# Patient Record
Sex: MALE | Race: WHITE | NOT HISPANIC OR LATINO | Employment: UNEMPLOYED | URBAN - METROPOLITAN AREA
[De-identification: names, ages, dates, MRNs, and addresses within clinical notes are randomized per-mention and may not be internally consistent; named-entity substitution may affect disease eponyms.]

---

## 2020-11-03 DIAGNOSIS — Z11.59 ENCOUNTER FOR SCREENING FOR OTHER VIRAL DISEASES: Primary | ICD-10-CM

## 2020-11-16 DIAGNOSIS — Z11.59 ENCOUNTER FOR SCREENING FOR OTHER VIRAL DISEASES: Primary | ICD-10-CM

## 2020-11-23 DIAGNOSIS — Z11.59 ENCOUNTER FOR SCREENING FOR OTHER VIRAL DISEASES: ICD-10-CM

## 2020-11-23 PROCEDURE — U0003 INFECTIOUS AGENT DETECTION BY NUCLEIC ACID (DNA OR RNA); SEVERE ACUTE RESPIRATORY SYNDROME CORONAVIRUS 2 (SARS-COV-2) (CORONAVIRUS DISEASE [COVID-19]), AMPLIFIED PROBE TECHNIQUE, MAKING USE OF HIGH THROUGHPUT TECHNOLOGIES AS DESCRIBED BY CMS-2020-01-R: HCPCS | Performed by: ORTHOPAEDIC SURGERY

## 2020-11-24 LAB
SARS-COV-2 RNA SPEC QL NAA+PROBE: NOT DETECTED
SPECIMEN SOURCE: NORMAL

## 2020-11-26 RX ORDER — TAMSULOSIN HYDROCHLORIDE 0.4 MG/1
0.8 CAPSULE ORAL EVERY EVENING
COMMUNITY

## 2020-11-26 RX ORDER — ASPIRIN 81 MG
100 TABLET, DELAYED RELEASE (ENTERIC COATED) ORAL DAILY
COMMUNITY

## 2020-11-26 RX ORDER — CETIRIZINE HYDROCHLORIDE 10 MG/1
10 TABLET ORAL EVERY EVENING
COMMUNITY

## 2020-11-26 RX ORDER — PRIMIDONE 50 MG/1
50 TABLET ORAL 2 TIMES DAILY
COMMUNITY

## 2020-11-26 RX ORDER — PREGABALIN 150 MG/1
150 CAPSULE ORAL 2 TIMES DAILY
COMMUNITY

## 2020-11-26 RX ORDER — ASPIRIN 81 MG/1
81 TABLET ORAL EVERY EVENING
Status: ON HOLD | COMMUNITY
End: 2020-11-28

## 2020-11-26 RX ORDER — IBUPROFEN 200 MG
200-800 TABLET ORAL 3 TIMES DAILY PRN
Status: ON HOLD | COMMUNITY
End: 2020-11-28

## 2020-11-26 RX ORDER — CYANOCOBALAMIN 1000 UG/ML
1 INJECTION, SOLUTION INTRAMUSCULAR; SUBCUTANEOUS
COMMUNITY

## 2020-11-26 RX ORDER — DICYCLOMINE HCL 20 MG
20 TABLET ORAL EVERY MORNING
COMMUNITY

## 2020-11-26 RX ORDER — PRIMIDONE 250 MG/1
250 TABLET ORAL 2 TIMES DAILY
COMMUNITY

## 2020-11-27 ENCOUNTER — APPOINTMENT (OUTPATIENT)
Dept: PHYSICAL THERAPY | Facility: CLINIC | Age: 65
End: 2020-11-27
Attending: ORTHOPAEDIC SURGERY
Payer: COMMERCIAL

## 2020-11-27 ENCOUNTER — ANESTHESIA EVENT (OUTPATIENT)
Dept: SURGERY | Facility: CLINIC | Age: 65
End: 2020-11-27
Payer: COMMERCIAL

## 2020-11-27 ENCOUNTER — ANESTHESIA (OUTPATIENT)
Dept: SURGERY | Facility: CLINIC | Age: 65
End: 2020-11-27
Payer: COMMERCIAL

## 2020-11-27 ENCOUNTER — HOSPITAL ENCOUNTER (OUTPATIENT)
Facility: CLINIC | Age: 65
Discharge: HOME | End: 2020-11-28
Attending: ORTHOPAEDIC SURGERY | Admitting: ORTHOPAEDIC SURGERY
Payer: COMMERCIAL

## 2020-11-27 ENCOUNTER — APPOINTMENT (OUTPATIENT)
Dept: GENERAL RADIOLOGY | Facility: CLINIC | Age: 65
End: 2020-11-27
Attending: ORTHOPAEDIC SURGERY
Payer: COMMERCIAL

## 2020-11-27 DIAGNOSIS — Z96.652 S/P TOTAL KNEE ARTHROPLASTY, LEFT: Primary | ICD-10-CM

## 2020-11-27 LAB
CREAT SERPL-MCNC: 1.04 MG/DL (ref 0.66–1.25)
GFR SERPL CREATININE-BSD FRML MDRD: 76 ML/MIN/{1.73_M2}
GLUCOSE BLDC GLUCOMTR-MCNC: 141 MG/DL (ref 70–99)
GLUCOSE SERPL-MCNC: 117 MG/DL (ref 70–99)
POTASSIUM SERPL-SCNC: 4.3 MMOL/L (ref 3.4–5.3)

## 2020-11-27 PROCEDURE — 250N000011 HC RX IP 250 OP 636: Performed by: PHYSICIAN ASSISTANT

## 2020-11-27 PROCEDURE — 250N000009 HC RX 250: Performed by: ORTHOPAEDIC SURGERY

## 2020-11-27 PROCEDURE — 370N000002 HC ANESTHESIA TECHNICAL FEE, EACH ADDTL 15 MIN: Performed by: ORTHOPAEDIC SURGERY

## 2020-11-27 PROCEDURE — 97116 GAIT TRAINING THERAPY: CPT | Mod: GP | Performed by: PHYSICAL THERAPIST

## 2020-11-27 PROCEDURE — 278N000051 HC OR IMPLANT GENERAL: Performed by: ORTHOPAEDIC SURGERY

## 2020-11-27 PROCEDURE — 36415 COLL VENOUS BLD VENIPUNCTURE: CPT | Performed by: ANESTHESIOLOGY

## 2020-11-27 PROCEDURE — 250N000011 HC RX IP 250 OP 636: Performed by: NURSE ANESTHETIST, CERTIFIED REGISTERED

## 2020-11-27 PROCEDURE — 360N000026 HC SURGERY LEVEL 4 1ST 30 MIN: Performed by: ORTHOPAEDIC SURGERY

## 2020-11-27 PROCEDURE — 250N000009 HC RX 250: Performed by: NURSE ANESTHETIST, CERTIFIED REGISTERED

## 2020-11-27 PROCEDURE — 97161 PT EVAL LOW COMPLEX 20 MIN: CPT | Mod: GP | Performed by: PHYSICAL THERAPIST

## 2020-11-27 PROCEDURE — 250N000009 HC RX 250: Performed by: ANESTHESIOLOGY

## 2020-11-27 PROCEDURE — 999N001017 HC STATISTIC GLUCOSE BY METER IP

## 2020-11-27 PROCEDURE — 370N000001 HC ANESTHESIA TECHNICAL FEE, 1ST 30 MIN: Performed by: ORTHOPAEDIC SURGERY

## 2020-11-27 PROCEDURE — 761N000001 HC RECOVERY PHASE 1 LEVEL 1 FIRST HR: Performed by: ORTHOPAEDIC SURGERY

## 2020-11-27 PROCEDURE — 82565 ASSAY OF CREATININE: CPT | Performed by: ANESTHESIOLOGY

## 2020-11-27 PROCEDURE — 258N000003 HC RX IP 258 OP 636: Performed by: PHYSICIAN ASSISTANT

## 2020-11-27 PROCEDURE — 97530 THERAPEUTIC ACTIVITIES: CPT | Mod: GP | Performed by: PHYSICAL THERAPIST

## 2020-11-27 PROCEDURE — 250N000011 HC RX IP 250 OP 636: Performed by: ANESTHESIOLOGY

## 2020-11-27 PROCEDURE — 258N000001 HC RX 258: Performed by: ORTHOPAEDIC SURGERY

## 2020-11-27 PROCEDURE — 999N000063 XR KNEE PORT LT 1/2 VW: Mod: LT

## 2020-11-27 PROCEDURE — 258N000003 HC RX IP 258 OP 636: Performed by: ORTHOPAEDIC SURGERY

## 2020-11-27 PROCEDURE — 258N000003 HC RX IP 258 OP 636: Performed by: ANESTHESIOLOGY

## 2020-11-27 PROCEDURE — 250N000013 HC RX MED GY IP 250 OP 250 PS 637: Performed by: ORTHOPAEDIC SURGERY

## 2020-11-27 PROCEDURE — 84132 ASSAY OF SERUM POTASSIUM: CPT | Performed by: ANESTHESIOLOGY

## 2020-11-27 PROCEDURE — 82947 ASSAY GLUCOSE BLOOD QUANT: CPT | Performed by: ANESTHESIOLOGY

## 2020-11-27 PROCEDURE — 250N000013 HC RX MED GY IP 250 OP 250 PS 637: Performed by: ANESTHESIOLOGY

## 2020-11-27 PROCEDURE — 272N000001 HC OR GENERAL SUPPLY STERILE: Performed by: ORTHOPAEDIC SURGERY

## 2020-11-27 PROCEDURE — 250N000013 HC RX MED GY IP 250 OP 250 PS 637: Performed by: PHYSICIAN ASSISTANT

## 2020-11-27 PROCEDURE — 999N000140 HC STATISTIC PRE-PROCEDURE ASSESSMENT III: Performed by: ORTHOPAEDIC SURGERY

## 2020-11-27 PROCEDURE — C1776 JOINT DEVICE (IMPLANTABLE): HCPCS | Performed by: ORTHOPAEDIC SURGERY

## 2020-11-27 PROCEDURE — 250N000011 HC RX IP 250 OP 636: Performed by: ORTHOPAEDIC SURGERY

## 2020-11-27 PROCEDURE — 360N000027 HC SURGERY LEVEL 4 EA 15 ADDTL MIN: Performed by: ORTHOPAEDIC SURGERY

## 2020-11-27 PROCEDURE — 258N000003 HC RX IP 258 OP 636: Performed by: NURSE ANESTHETIST, CERTIFIED REGISTERED

## 2020-11-27 PROCEDURE — 97110 THERAPEUTIC EXERCISES: CPT | Mod: GP | Performed by: PHYSICAL THERAPIST

## 2020-11-27 DEVICE — GEN II RESURFACING PATELLA 38MM
Type: IMPLANTABLE DEVICE | Site: KNEE | Status: FUNCTIONAL
Brand: GENESIS II

## 2020-11-27 DEVICE — LEGION PS OXINIUM FEMORAL SZ 8 LEFT
Type: IMPLANTABLE DEVICE | Site: KNEE | Status: FUNCTIONAL
Brand: LEGION

## 2020-11-27 DEVICE — GENESIS II NON-POROUS TIBIAL                                    BASEPLATE SIZE 7 LEFT
Type: IMPLANTABLE DEVICE | Site: KNEE | Status: FUNCTIONAL
Brand: GENESIS II

## 2020-11-27 DEVICE — BONE CEMENT RADIOPAQUE SIMPLEX HV FULL DOSE 6194-1-001: Type: IMPLANTABLE DEVICE | Site: KNEE | Status: FUNCTIONAL

## 2020-11-27 DEVICE — LEGION PS HIGH FLEX XLPE SZ 7-8 10MM
Type: IMPLANTABLE DEVICE | Site: KNEE | Status: FUNCTIONAL
Brand: LEGION

## 2020-11-27 RX ORDER — BISACODYL 10 MG
10 SUPPOSITORY, RECTAL RECTAL DAILY PRN
Status: DISCONTINUED | OUTPATIENT
Start: 2020-11-27 | End: 2020-11-28 | Stop reason: HOSPADM

## 2020-11-27 RX ORDER — CEFAZOLIN SODIUM 2 G/100ML
2 INJECTION, SOLUTION INTRAVENOUS EVERY 8 HOURS
Status: COMPLETED | OUTPATIENT
Start: 2020-11-27 | End: 2020-11-28

## 2020-11-27 RX ORDER — HYDROMORPHONE HYDROCHLORIDE 1 MG/ML
0.2 INJECTION, SOLUTION INTRAMUSCULAR; INTRAVENOUS; SUBCUTANEOUS
Status: DISCONTINUED | OUTPATIENT
Start: 2020-11-27 | End: 2020-11-28 | Stop reason: HOSPADM

## 2020-11-27 RX ORDER — LIDOCAINE 40 MG/G
CREAM TOPICAL
Status: DISCONTINUED | OUTPATIENT
Start: 2020-11-27 | End: 2020-11-28 | Stop reason: HOSPADM

## 2020-11-27 RX ORDER — ACETAMINOPHEN 325 MG/1
975 TABLET ORAL ONCE
Status: COMPLETED | OUTPATIENT
Start: 2020-11-27 | End: 2020-11-27

## 2020-11-27 RX ORDER — FENTANYL CITRATE 50 UG/ML
25 INJECTION, SOLUTION INTRAMUSCULAR; INTRAVENOUS EVERY 5 MIN PRN
Status: DISCONTINUED | OUTPATIENT
Start: 2020-11-27 | End: 2020-11-27 | Stop reason: HOSPADM

## 2020-11-27 RX ORDER — PROCHLORPERAZINE MALEATE 5 MG
10 TABLET ORAL EVERY 6 HOURS PRN
Status: DISCONTINUED | OUTPATIENT
Start: 2020-11-27 | End: 2020-11-28 | Stop reason: HOSPADM

## 2020-11-27 RX ORDER — PRIMIDONE 50 MG/1
50 TABLET ORAL 2 TIMES DAILY
Status: DISCONTINUED | OUTPATIENT
Start: 2020-11-27 | End: 2020-11-27 | Stop reason: DRUGHIGH

## 2020-11-27 RX ORDER — NALOXONE HYDROCHLORIDE 0.4 MG/ML
0.4 INJECTION, SOLUTION INTRAMUSCULAR; INTRAVENOUS; SUBCUTANEOUS
Status: DISCONTINUED | OUTPATIENT
Start: 2020-11-27 | End: 2020-11-28 | Stop reason: HOSPADM

## 2020-11-27 RX ORDER — LISINOPRIL 10 MG/1
10 TABLET ORAL DAILY
Status: DISCONTINUED | OUTPATIENT
Start: 2020-11-27 | End: 2020-11-28 | Stop reason: HOSPADM

## 2020-11-27 RX ORDER — TAMSULOSIN HYDROCHLORIDE 0.4 MG/1
0.8 CAPSULE ORAL EVERY EVENING
Status: DISCONTINUED | OUTPATIENT
Start: 2020-11-27 | End: 2020-11-28 | Stop reason: HOSPADM

## 2020-11-27 RX ORDER — SODIUM CHLORIDE, SODIUM LACTATE, POTASSIUM CHLORIDE, CALCIUM CHLORIDE 600; 310; 30; 20 MG/100ML; MG/100ML; MG/100ML; MG/100ML
INJECTION, SOLUTION INTRAVENOUS CONTINUOUS
Status: DISCONTINUED | OUTPATIENT
Start: 2020-11-27 | End: 2020-11-27 | Stop reason: HOSPADM

## 2020-11-27 RX ORDER — CEFAZOLIN SODIUM IN 0.9 % NACL 3 G/100 ML
3 INTRAVENOUS SOLUTION, PIGGYBACK (ML) INTRAVENOUS
Status: COMPLETED | OUTPATIENT
Start: 2020-11-27 | End: 2020-11-27

## 2020-11-27 RX ORDER — ONDANSETRON 2 MG/ML
4 INJECTION INTRAMUSCULAR; INTRAVENOUS EVERY 6 HOURS PRN
Status: DISCONTINUED | OUTPATIENT
Start: 2020-11-27 | End: 2020-11-28 | Stop reason: HOSPADM

## 2020-11-27 RX ORDER — HYDROXYZINE HYDROCHLORIDE 25 MG/1
25 TABLET, FILM COATED ORAL EVERY 6 HOURS PRN
Qty: 30 TABLET | Refills: 0 | Status: SHIPPED | OUTPATIENT
Start: 2020-11-27

## 2020-11-27 RX ORDER — BUPIVACAINE HYDROCHLORIDE 7.5 MG/ML
INJECTION, SOLUTION INTRASPINAL PRN
Status: DISCONTINUED | OUTPATIENT
Start: 2020-11-27 | End: 2020-11-27

## 2020-11-27 RX ORDER — HYDROXYZINE HYDROCHLORIDE 25 MG/1
25 TABLET, FILM COATED ORAL EVERY 6 HOURS PRN
Status: DISCONTINUED | OUTPATIENT
Start: 2020-11-27 | End: 2020-11-28 | Stop reason: HOSPADM

## 2020-11-27 RX ORDER — FENTANYL CITRATE 50 UG/ML
50 INJECTION, SOLUTION INTRAMUSCULAR; INTRAVENOUS
Status: COMPLETED | OUTPATIENT
Start: 2020-11-27 | End: 2020-11-27

## 2020-11-27 RX ORDER — SODIUM CHLORIDE, SODIUM LACTATE, POTASSIUM CHLORIDE, CALCIUM CHLORIDE 600; 310; 30; 20 MG/100ML; MG/100ML; MG/100ML; MG/100ML
INJECTION, SOLUTION INTRAVENOUS CONTINUOUS
Status: DISCONTINUED | OUTPATIENT
Start: 2020-11-27 | End: 2020-11-28 | Stop reason: HOSPADM

## 2020-11-27 RX ORDER — LIDOCAINE HYDROCHLORIDE 20 MG/ML
INJECTION, SOLUTION INFILTRATION; PERINEURAL PRN
Status: DISCONTINUED | OUTPATIENT
Start: 2020-11-27 | End: 2020-11-27

## 2020-11-27 RX ORDER — ONDANSETRON 4 MG/1
4 TABLET, ORALLY DISINTEGRATING ORAL EVERY 6 HOURS PRN
Status: DISCONTINUED | OUTPATIENT
Start: 2020-11-27 | End: 2020-11-28 | Stop reason: HOSPADM

## 2020-11-27 RX ORDER — ACETAMINOPHEN 325 MG/1
650 TABLET ORAL EVERY 4 HOURS PRN
Status: DISCONTINUED | OUTPATIENT
Start: 2020-11-30 | End: 2020-11-28 | Stop reason: HOSPADM

## 2020-11-27 RX ORDER — CETIRIZINE HYDROCHLORIDE 10 MG/1
10 TABLET ORAL EVERY EVENING
Status: DISCONTINUED | OUTPATIENT
Start: 2020-11-27 | End: 2020-11-28 | Stop reason: HOSPADM

## 2020-11-27 RX ORDER — OXYCODONE HYDROCHLORIDE 5 MG/1
5 TABLET ORAL
Status: DISCONTINUED | OUTPATIENT
Start: 2020-11-27 | End: 2020-11-28

## 2020-11-27 RX ORDER — PREGABALIN 150 MG/1
150 CAPSULE ORAL 2 TIMES DAILY
Status: DISCONTINUED | OUTPATIENT
Start: 2020-11-27 | End: 2020-11-28 | Stop reason: HOSPADM

## 2020-11-27 RX ORDER — TRANEXAMIC ACID 650 MG/1
1950 TABLET ORAL ONCE
Status: COMPLETED | OUTPATIENT
Start: 2020-11-27 | End: 2020-11-27

## 2020-11-27 RX ORDER — OXYCODONE HYDROCHLORIDE 5 MG/1
10 TABLET ORAL EVERY 4 HOURS PRN
Status: DISCONTINUED | OUTPATIENT
Start: 2020-11-27 | End: 2020-11-28

## 2020-11-27 RX ORDER — PROPOFOL 10 MG/ML
INJECTION, EMULSION INTRAVENOUS PRN
Status: DISCONTINUED | OUTPATIENT
Start: 2020-11-27 | End: 2020-11-27

## 2020-11-27 RX ORDER — CEFAZOLIN SODIUM 1 G/3ML
1 INJECTION, POWDER, FOR SOLUTION INTRAMUSCULAR; INTRAVENOUS SEE ADMIN INSTRUCTIONS
Status: DISCONTINUED | OUTPATIENT
Start: 2020-11-27 | End: 2020-11-27 | Stop reason: HOSPADM

## 2020-11-27 RX ORDER — POLYETHYLENE GLYCOL 3350 17 G/17G
17 POWDER, FOR SOLUTION ORAL DAILY
Status: DISCONTINUED | OUTPATIENT
Start: 2020-11-28 | End: 2020-11-28 | Stop reason: HOSPADM

## 2020-11-27 RX ORDER — HYDROMORPHONE HYDROCHLORIDE 1 MG/ML
0.4 INJECTION, SOLUTION INTRAMUSCULAR; INTRAVENOUS; SUBCUTANEOUS
Status: DISCONTINUED | OUTPATIENT
Start: 2020-11-27 | End: 2020-11-28 | Stop reason: HOSPADM

## 2020-11-27 RX ORDER — DOCUSATE SODIUM 100 MG/1
100 CAPSULE, LIQUID FILLED ORAL 2 TIMES DAILY
Status: DISCONTINUED | OUTPATIENT
Start: 2020-11-27 | End: 2020-11-28 | Stop reason: HOSPADM

## 2020-11-27 RX ORDER — EPHEDRINE SULFATE 50 MG/ML
INJECTION, SOLUTION INTRAMUSCULAR; INTRAVENOUS; SUBCUTANEOUS PRN
Status: DISCONTINUED | OUTPATIENT
Start: 2020-11-27 | End: 2020-11-27

## 2020-11-27 RX ORDER — ACETAMINOPHEN 325 MG/1
975 TABLET ORAL EVERY 8 HOURS
Status: DISCONTINUED | OUTPATIENT
Start: 2020-11-27 | End: 2020-11-28 | Stop reason: HOSPADM

## 2020-11-27 RX ORDER — AMOXICILLIN 250 MG
1-2 CAPSULE ORAL 2 TIMES DAILY
Qty: 30 TABLET | Refills: 0 | Status: SHIPPED | OUTPATIENT
Start: 2020-11-27

## 2020-11-27 RX ORDER — NALOXONE HYDROCHLORIDE 0.4 MG/ML
0.2 INJECTION, SOLUTION INTRAMUSCULAR; INTRAVENOUS; SUBCUTANEOUS
Status: DISCONTINUED | OUTPATIENT
Start: 2020-11-27 | End: 2020-11-28 | Stop reason: HOSPADM

## 2020-11-27 RX ORDER — DICYCLOMINE HCL 20 MG
20 TABLET ORAL EVERY MORNING
Status: DISCONTINUED | OUTPATIENT
Start: 2020-11-27 | End: 2020-11-28 | Stop reason: HOSPADM

## 2020-11-27 RX ORDER — HYDROMORPHONE HYDROCHLORIDE 1 MG/ML
.3-.5 INJECTION, SOLUTION INTRAMUSCULAR; INTRAVENOUS; SUBCUTANEOUS EVERY 5 MIN PRN
Status: DISCONTINUED | OUTPATIENT
Start: 2020-11-27 | End: 2020-11-27 | Stop reason: HOSPADM

## 2020-11-27 RX ORDER — FENTANYL CITRATE 50 UG/ML
25-50 INJECTION, SOLUTION INTRAMUSCULAR; INTRAVENOUS
Status: DISCONTINUED | OUTPATIENT
Start: 2020-11-27 | End: 2020-11-27 | Stop reason: HOSPADM

## 2020-11-27 RX ORDER — ONDANSETRON 2 MG/ML
4 INJECTION INTRAMUSCULAR; INTRAVENOUS EVERY 30 MIN PRN
Status: DISCONTINUED | OUTPATIENT
Start: 2020-11-27 | End: 2020-11-27 | Stop reason: HOSPADM

## 2020-11-27 RX ORDER — ONDANSETRON 2 MG/ML
INJECTION INTRAMUSCULAR; INTRAVENOUS PRN
Status: DISCONTINUED | OUTPATIENT
Start: 2020-11-27 | End: 2020-11-27

## 2020-11-27 RX ORDER — FENTANYL CITRATE 50 UG/ML
INJECTION, SOLUTION INTRAMUSCULAR; INTRAVENOUS PRN
Status: DISCONTINUED | OUTPATIENT
Start: 2020-11-27 | End: 2020-11-27

## 2020-11-27 RX ORDER — ONDANSETRON 4 MG/1
4 TABLET, ORALLY DISINTEGRATING ORAL EVERY 30 MIN PRN
Status: DISCONTINUED | OUTPATIENT
Start: 2020-11-27 | End: 2020-11-27 | Stop reason: HOSPADM

## 2020-11-27 RX ORDER — PROPOFOL 10 MG/ML
INJECTION, EMULSION INTRAVENOUS CONTINUOUS PRN
Status: DISCONTINUED | OUTPATIENT
Start: 2020-11-27 | End: 2020-11-27

## 2020-11-27 RX ORDER — OXYCODONE HYDROCHLORIDE 5 MG/1
5-10 TABLET ORAL
Qty: 50 TABLET | Refills: 0 | Status: SHIPPED | OUTPATIENT
Start: 2020-11-27

## 2020-11-27 RX ORDER — MAGNESIUM HYDROXIDE 1200 MG/15ML
LIQUID ORAL PRN
Status: DISCONTINUED | OUTPATIENT
Start: 2020-11-27 | End: 2020-11-27 | Stop reason: HOSPADM

## 2020-11-27 RX ORDER — ACETAMINOPHEN 325 MG/1
650 TABLET ORAL EVERY 4 HOURS PRN
Qty: 100 TABLET | Refills: 0 | Status: SHIPPED | OUTPATIENT
Start: 2020-11-27

## 2020-11-27 RX ORDER — AMOXICILLIN 250 MG
1 CAPSULE ORAL 2 TIMES DAILY
Status: DISCONTINUED | OUTPATIENT
Start: 2020-11-27 | End: 2020-11-28 | Stop reason: HOSPADM

## 2020-11-27 RX ADMIN — Medication 5 MG: at 08:03

## 2020-11-27 RX ADMIN — BUPIVACAINE HYDROCHLORIDE IN DEXTROSE 2 ML: 7.5 INJECTION, SOLUTION SUBARACHNOID at 07:44

## 2020-11-27 RX ADMIN — PRIMIDONE 300 MG: 50 TABLET ORAL at 22:46

## 2020-11-27 RX ADMIN — BUPIVACAINE HYDROCHLORIDE 20 ML GIVEN: 5 INJECTION, SOLUTION EPIDURAL; INTRACAUDAL; PERINEURAL at 07:12

## 2020-11-27 RX ADMIN — HYDROMORPHONE HYDROCHLORIDE 0.4 MG: 1 INJECTION, SOLUTION INTRAMUSCULAR; INTRAVENOUS; SUBCUTANEOUS at 23:59

## 2020-11-27 RX ADMIN — ASPIRIN 325 MG: 325 TABLET, COATED ORAL at 12:59

## 2020-11-27 RX ADMIN — TAMSULOSIN HYDROCHLORIDE 0.8 MG: 0.4 CAPSULE ORAL at 22:47

## 2020-11-27 RX ADMIN — FENTANYL CITRATE 50 MCG: 50 INJECTION, SOLUTION INTRAMUSCULAR; INTRAVENOUS at 07:39

## 2020-11-27 RX ADMIN — OXYCODONE HYDROCHLORIDE 5 MG: 5 TABLET ORAL at 13:55

## 2020-11-27 RX ADMIN — ONDANSETRON 4 MG: 2 INJECTION INTRAMUSCULAR; INTRAVENOUS at 09:00

## 2020-11-27 RX ADMIN — Medication 5 MG: at 07:55

## 2020-11-27 RX ADMIN — PROPOFOL 40 MG: 10 INJECTION, EMULSION INTRAVENOUS at 08:01

## 2020-11-27 RX ADMIN — TRANEXAMIC ACID 1950 MG: 650 TABLET ORAL at 06:07

## 2020-11-27 RX ADMIN — SODIUM CHLORIDE, POTASSIUM CHLORIDE, SODIUM LACTATE AND CALCIUM CHLORIDE: 600; 310; 30; 20 INJECTION, SOLUTION INTRAVENOUS at 09:33

## 2020-11-27 RX ADMIN — FENTANYL CITRATE 25 MCG: 50 INJECTION, SOLUTION INTRAMUSCULAR; INTRAVENOUS at 07:07

## 2020-11-27 RX ADMIN — PHENYLEPHRINE HYDROCHLORIDE 50 MCG: 10 INJECTION INTRAVENOUS at 07:55

## 2020-11-27 RX ADMIN — DEXMEDETOMIDINE HYDROCHLORIDE: 100 INJECTION, SOLUTION INTRAVENOUS at 07:42

## 2020-11-27 RX ADMIN — Medication 1 G: at 09:42

## 2020-11-27 RX ADMIN — ACETAMINOPHEN 975 MG: 325 TABLET, FILM COATED ORAL at 23:57

## 2020-11-27 RX ADMIN — ASPIRIN 325 MG: 325 TABLET, COATED ORAL at 21:28

## 2020-11-27 RX ADMIN — PHENYLEPHRINE HYDROCHLORIDE 50 MCG: 10 INJECTION INTRAVENOUS at 08:01

## 2020-11-27 RX ADMIN — PREGABALIN 150 MG: 150 CAPSULE ORAL at 21:28

## 2020-11-27 RX ADMIN — DOCUSATE SODIUM 50 MG AND SENNOSIDES 8.6 MG 1 TABLET: 8.6; 5 TABLET, FILM COATED ORAL at 22:47

## 2020-11-27 RX ADMIN — HYDROXYZINE HYDROCHLORIDE 25 MG: 25 TABLET, FILM COATED ORAL at 21:28

## 2020-11-27 RX ADMIN — LIDOCAINE HYDROCHLORIDE 80 MG: 20 INJECTION, SOLUTION INFILTRATION; PERINEURAL at 07:50

## 2020-11-27 RX ADMIN — OXYCODONE HYDROCHLORIDE 10 MG: 5 TABLET ORAL at 18:08

## 2020-11-27 RX ADMIN — MIDAZOLAM 2 MG: 1 INJECTION INTRAMUSCULAR; INTRAVENOUS at 07:35

## 2020-11-27 RX ADMIN — CETIRIZINE HYDROCHLORIDE 10 MG: 10 TABLET, FILM COATED ORAL at 21:28

## 2020-11-27 RX ADMIN — CEFAZOLIN SODIUM 2 G: 2 INJECTION, SOLUTION INTRAVENOUS at 18:08

## 2020-11-27 RX ADMIN — Medication 3 G: at 07:43

## 2020-11-27 RX ADMIN — PHENYLEPHRINE HYDROCHLORIDE 100 MCG: 10 INJECTION INTRAVENOUS at 09:28

## 2020-11-27 RX ADMIN — METFORMIN HYDROCHLORIDE 1000 MG: 500 TABLET, FILM COATED ORAL at 18:08

## 2020-11-27 RX ADMIN — HYDROXYZINE HYDROCHLORIDE 25 MG: 25 TABLET, FILM COATED ORAL at 12:58

## 2020-11-27 RX ADMIN — DICYCLOMINE HYDROCHLORIDE 20 MG: 20 TABLET ORAL at 13:55

## 2020-11-27 RX ADMIN — SODIUM CHLORIDE, POTASSIUM CHLORIDE, SODIUM LACTATE AND CALCIUM CHLORIDE: 600; 310; 30; 20 INJECTION, SOLUTION INTRAVENOUS at 07:00

## 2020-11-27 RX ADMIN — HYDROMORPHONE HYDROCHLORIDE 0.2 MG: 1 INJECTION, SOLUTION INTRAMUSCULAR; INTRAVENOUS; SUBCUTANEOUS at 21:28

## 2020-11-27 RX ADMIN — PHENYLEPHRINE HYDROCHLORIDE 0.5 MCG/KG/MIN: 10 INJECTION INTRAVENOUS at 08:10

## 2020-11-27 RX ADMIN — ACETAMINOPHEN 975 MG: 325 TABLET, FILM COATED ORAL at 16:31

## 2020-11-27 RX ADMIN — OXYCODONE HYDROCHLORIDE 10 MG: 5 TABLET ORAL at 22:21

## 2020-11-27 RX ADMIN — PHENYLEPHRINE HYDROCHLORIDE 100 MCG: 10 INJECTION INTRAVENOUS at 08:03

## 2020-11-27 RX ADMIN — ACETAMINOPHEN 975 MG: 325 TABLET, FILM COATED ORAL at 07:00

## 2020-11-27 RX ADMIN — SODIUM CHLORIDE, POTASSIUM CHLORIDE, SODIUM LACTATE AND CALCIUM CHLORIDE: 600; 310; 30; 20 INJECTION, SOLUTION INTRAVENOUS at 12:29

## 2020-11-27 RX ADMIN — PROPOFOL 75 MCG/KG/MIN: 10 INJECTION, EMULSION INTRAVENOUS at 07:47

## 2020-11-27 RX ADMIN — HYDROMORPHONE HYDROCHLORIDE 0.4 MG: 1 INJECTION, SOLUTION INTRAMUSCULAR; INTRAVENOUS; SUBCUTANEOUS at 18:54

## 2020-11-27 RX ADMIN — MIDAZOLAM HYDROCHLORIDE 1 MG: 1 INJECTION, SOLUTION INTRAMUSCULAR; INTRAVENOUS at 07:07

## 2020-11-27 RX ADMIN — DEXMEDETOMIDINE HYDROCHLORIDE 12 MCG: 100 INJECTION, SOLUTION INTRAVENOUS at 07:41

## 2020-11-27 RX ADMIN — LISINOPRIL 10 MG: 10 TABLET ORAL at 12:59

## 2020-11-27 RX ADMIN — HYDROMORPHONE HYDROCHLORIDE 0.2 MG: 1 INJECTION, SOLUTION INTRAMUSCULAR; INTRAVENOUS; SUBCUTANEOUS at 12:28

## 2020-11-27 RX ADMIN — DOCUSATE SODIUM 100 MG: 100 CAPSULE, LIQUID FILLED ORAL at 21:28

## 2020-11-27 RX ADMIN — Medication 5 MG: at 08:01

## 2020-11-27 ASSESSMENT — ENCOUNTER SYMPTOMS
SEIZURES: 0
DYSRHYTHMIAS: 0
ORTHOPNEA: 0

## 2020-11-27 ASSESSMENT — MIFFLIN-ST. JEOR: SCORE: 2177.92

## 2020-11-27 NOTE — ANESTHESIA PREPROCEDURE EVALUATION
Anesthesia Pre-Procedure Evaluation    Patient: Johny Pisano   MRN: [id number] : 1955          Preoperative Diagnosis: Left knee DJD [M17.12]    Procedure(s):  LEFT TOTAL KNEE ARTHROPLASTY    Past Medical History:   Diagnosis Date     Acute insomnia      Benign essential tremor      BPH (benign prostatic hyperplasia)      Depression      Diabetes mellitus, type 2 (H)      Diabetic foot ulcers (H)      Diabetic ulcer of toe (H)      HTN (hypertension)      Hx of basal cell carcinoma      Hyperlipidemia      IBS (irritable bowel syndrome)      Neuropathy      Obesity      Obesity      Right knee pain      Snores      Tremor      Tubular adenoma of colon      Vitamin B12 deficiency      Past Surgical History:   Procedure Laterality Date     ARTHROSCOPY KNEE WITH MEDIAL MENISCECTOMY  10/24/2013    Procedure: ARTHROSCOPY KNEE WITH MEDIAL MENISCECTOMY;  RIGHT KNEE ARTHROSCOPY, PARTIAL MEDIAL MENISCECTOMY, Patella Chrondoplasty  ;  Surgeon: Mannie Lopes MD;  Location: [facility]     ATHROSCOPIC KNEE SURGERY[       FISTULOTOMY RECTUM       JOINT REPLACEMENT       NASAL SEPTIM SURGERY[       REPAIR TENDON ACHILLES       EKG - SB, first degree AVB, LAD    Anesthesia Evaluation     .             ROS/MED HX    ENT/Pulmonary:     (+)CARLI risk factors snores loudly, hypertension, obese, , . .   (-) recent URI   Neurologic: Comment: Insomnia  Essential tremor    (+)neuropathy    (-) seizures, CVA and TIA   Cardiovascular:     (+) hypertension----. : . . . :. .      (-) CHF, orthopnea/PND and arrhythmias   METS/Exercise Tolerance:     Hematologic:  - neg hematologic  ROS       Musculoskeletal: Comment: Diabetic toe ulcer  (+) arthritis,  -       GI/Hepatic:  - neg GI/hepatic ROS   (+) Other GI/Hepatic IBS      Renal/Genitourinary:     (+) BPH,       Endo:     (+) type II DM Last HgA1c: 5.7 Diabetic complications: neuropathy, Obesity, .      Psychiatric:     (+) psychiatric history depression      Infectious Disease:     "     Malignancy:   (+) Malignancy History of Skin          Other:                          Physical Exam  Normal systems: cardiovascular, pulmonary and dental    Airway   Mallampati: III  TM distance: >3 FB  Neck ROM: full    Dental   (+) partials    Cardiovascular   Rhythm and rate: regular and normal      Pulmonary    breath sounds clear to auscultation            No results found for: WBC, HGB, HCT, PLT, CRP, SED, NA, POTASSIUM, CHLORIDE, CO2, BUN, CR, GLC, DUGLAS, PHOS, MAG, ALBUMIN, PROTTOTAL, ALT, AST, GGT, ALKPHOS, BILITOTAL, BILIDIRECT, LIPASE, AMYLASE, STEVO, PTT, INR, FIBR, TSH, T4, T3, HCG, HCGS, CKTOTAL, CKMB, TROPN    Preop Vitals  BP Readings from Last 3 Encounters:   10/24/13 132/78    Pulse Readings from Last 3 Encounters:   No data found for Pulse      Resp Readings from Last 3 Encounters:   10/24/13 18    SpO2 Readings from Last 3 Encounters:   10/24/13 97%      Temp Readings from Last 1 Encounters:   10/24/13 37.1  C (98.7  F)    Ht Readings from Last 1 Encounters:   10/24/13 1.854 m (6' 1\")      Wt Readings from Last 1 Encounters:   10/24/13 (!) 178.5 kg (393 lb 8 oz)    Estimated body mass index is 51.92 kg/m  as calculated from the following:    Height as of 10/24/13: 1.854 m (6' 1\").    Weight as of 10/24/13: 178.5 kg (393 lb 8 oz).       Anesthesia Plan      History & Physical Review  History and physical reviewed and following examination; no interval change.    ASA Status:  3 .    NPO Status:  > 8 hours    Plan for Spinal   PONV prophylaxis:  Ondansetron (or other 5HT-3)  The surgeon has given a verbal order transferring care of this patient to me for the performance of a regional analgesia block for post-op pain control. It is requested of me because I am uniquely trained and qualified to perform this block and the surgeon is neither trained nor qualified to perform this procedure.        Postoperative Care  Postoperative pain management:  Multi-modal analgesia.      Consents  Anesthetic " plan, risks, benefits and alternatives discussed with:  Patient..                 Teodoro Hahn MD

## 2020-11-27 NOTE — OP NOTE
Procedure Date: 11/27/2020      PREOPERATIVE DIAGNOSIS:  Left knee advanced osteoarthritis.      POSTOPERATIVE DIAGNOSIS:  Left knee advanced osteoarthritis.      PROCEDURE:  Left total knee arthroplasty.      SURGEON:  Mannie Lopes MD      ASSISTANT:  Aranza Will, TOYIN, PA-C      ANESTHESIA:  Spinal, IV sedation, periarticular block, adductor canal nerve block.      ESTIMATED BLOOD LOSS:  20 mL      IMPLANTS:  [employer] system:   1.  Size 8 left Oxinium PS femoral component.   2.  Size 7 left standard tibial baseplate.   3.  Size 10, 7/8 high-flex PS polyethylene.   4.  Size 38 concentric polyethylene patella.      INDICATIONS:  Johny was brought to the operating room on 11/27/2020 for left total knee arthroplasty.  Seen day of surgery in the preoperative holding area, and the left knee was marked as the correct operative site.  Received a dose of IV antibiotic within 30 minutes prior to surgical incision.  Post-surgical antibiotic and DVT prophylaxis are indicated and will be prescribed.  Skilled assistant was used for positioning, draping, retraction, closure, dressing application.      DESCRIPTION OF PROCEDURE:  The patient was brought to the operating room, placed under a spinal anesthesia.  Positioned supine.  The left lower extremity was prepped and draped in the standard sterile fashion.  Preoperative timeout was taken and thigh tourniquet inflated to 300 mmHg.  Anterior longitudinal surgical incision was made.  Medial parapatellar arthrotomy performed.  Suprapatellar synovium and infrapatellar fat pad excised.  Patella was cut to a thickness of 14 mm and sized to a 38.  The femur was prepared with an intramedullary guide asif and a 4-degree valgus cutting guide.  I took an extra 4 mm of distal femur due to the patient's preoperative flexion contracture.  The cut distal femur was sized to an 8.  It was prepared in the appropriate rotational alignment.  Box cut for the PS component  was made.  The tibia was then prepared with an extramedullary cutting guide, taking 7 mm of bone and cartilage off the lateral side.  The cut proximal tibia was sized to a 7.  It was punched and centered on the medial third of the tibial tubercle.  Posterior osteophytes were removed.  Menisci removed.  Posterior capsule injected with a cocktail of Toradol, Ropivacaine and saline.  With trial components in place and a size 10 PS polyethylene trial, the knee had excellent stability, range of motion and balanced gaps.  Trial components were removed.  Jet lavage irrigation performed.  Components listed above were cemented into place using 2 batches of high viscosity Simplex cement.  Once the cement had hardened and all extruded cement removed, we implanted the size 10 PS polyethylene for the 7 baseplate.  Tourniquet deflated.  Hemostasis achieved.  Betadine wash performed.  Jet lavage irrigation performed.  The arthrotomy was closed with interrupted Ethibond sutures over a medium Hemovac drain.  Superficial soft tissues were irrigated, closed in layers, and a sterile dressing was applied.  The patient was brought to the recovery area in stable condition.  Needle and lap counts were correct at the end of the case.  There were no known complications.         HORTENSIA TOLLIVER MD             D: 2020   T: 2020   MT:       Name:     NOMAN ALLRED   MRN:      [id number]        Account:        [id number]   :      1955           Procedure Date: 2020      Document: [id number]

## 2020-11-27 NOTE — PROGRESS NOTES
PTA medications updated by Medication Scribe prior to surgery via phone call with patient      -LAST DOSES ENTERED BY NURSE-    Comments:    Medication history sources: Patient, Surescripts and H&P  Medication history source reliability: Good  Adherence assessment: N/A Not Observed    Significant changes made to the medication list:  None      Additional medication history information:   None        Prior to Admission medications    Medication Sig Last Dose Taking? Auth Provider   aspirin 81 MG EC tablet Take 81 mg by mouth every evening  at PM Yes Reported, Patient   cetirizine (ZYRTEC) 10 MG tablet Take 10 mg by mouth every evening  at PM Yes Reported, Patient   cyanocobalamin (CYANOCOBALAMIN) 1000 MCG/ML injection Inject 1 mL into the muscle every 30 days 11/7/2020 Yes Reported, Patient   dicyclomine (BENTYL) 20 MG tablet Take 20 mg by mouth every morning  at AM Yes Reported, Patient   docusate sodium (COLACE) 100 MG tablet Take 100 mg by mouth daily  at AM Yes Reported, Patient   ibuprofen (ADVIL/MOTRIN) 200 MG tablet Take 200-800 mg by mouth 3 times daily as needed for mild pain  at PRN Yes Reported, Patient   Lisinopril (ZESTRIL PO) Take 10 mg by mouth daily   at AM Yes Reported, Patient   metFORMIN (GLUCOPHAGE) 1000 MG tablet Take 1,000 mg by mouth 2 times daily (with meals)   Yes Reported, Patient   Multiple Vitamins-Minerals (MULTIVITAMIN & MINERAL PO) Take 1 tablet by mouth daily   at AM Yes Reported, Patient   pregabalin (LYRICA) 150 MG capsule Take 150 mg by mouth 2 times daily  Yes Reported, Patient   primidone (MYSOLINE) 250 MG tablet Take 250 mg by mouth 2 times daily (in addition to 50mg tablet = 300mg dose)  Yes Reported, Patient   primidone (MYSOLINE) 50 MG tablet Take 50 mg by mouth 2 times daily (in addition to 250mg tablet = 300mg dose)  Yes Reported, Patient   PSYLLIUM PO Take 1 Tablespoonful by mouth 2 times daily  Yes Reported, Patient   tamsulosin (FLOMAX) 0.4 MG capsule Take 0.8 mg by mouth  every evening (2 x 0.4mg)  at PM Yes Reported, Patient   Vitamin D (Cholecalciferol) 25 MCG (1000 UT) CAPS Take 1,000 Units by mouth every evening   at PM Yes Reported, Patient

## 2020-11-27 NOTE — PROGRESS NOTES
Outpatient goals:  ~ Patient vital signs are at baseline: met  ~ Patient able to ambulate as they were prior to admission or with assist devices provided by therapies during their stay: not met- needs to perform stairs  ~ Patient MUST void prior to discharge: met  ~ Patient able to tolerate oral intake to maintain hydration status: met  ~ Patient has adequate pain control using Oral analgesics: not met, taking prn oxy, iv dilaudid, atarax  ~ Hypercapnia, hypoventilation or hypoxia resolved for at least 2 hours without supplemental oxygen: met  ~ Deficits in sensation, mobility or coordination have resolved if spinal or regional anesthesia was used: met  ~ Patient has returned to baseline mental status: met

## 2020-11-27 NOTE — ANESTHESIA PROCEDURE NOTES
Procedure note : Adductor canal      Staff -   Anesthesiologist:  Teodoro Hahn MD  Performed By: Anesthesiologist and anesthesiologist  Pre-Procedure  Performed by Teodoro Hahn MD  Location: pre-op      Pre-Anesthestic Checklist: patient identified, IV checked, site marked, risks and benefits discussed, informed consent, monitors and equipment checked, pre-op evaluation, at physician/surgeon's request and post-op pain management    Timeout  Correct Patient: Yes   Correct Procedure: Yes   Correct Site: Yes   Correct Laterality: Yes   Correct Position: Yes   Site Marked: Yes   .   Procedure Documentation    .    Procedure: Adductor canal, left.   Patient Position:supine Local skin infiltrated with 2 mL of 1% lidocaine.    Ultrasound used to identify targeted nerve, plexus, or vascular marker and placed a needle adjacent to it., Ultrasound was used to visualize the spread of the anesthetic in close proximity to the above stated nerve. A permanent image is entered into the patient's record.  Patient Prep/Sterile Barriers; chlorhexidine gluconate and isopropyl alcohol.  .  Needle: insulated, short bevel   Needle Gauge: 21.  Needle Length (millimeters) 100  Insertion Method: Single Shot.        Assessment/Narrative  Paresthesias: No.  .  The placement was negative for: blood aspirated, painful injection and site bleeding.  Bolus given via needle. No blood aspirated via catheter.   Secured via.   Complications: none. Comments:  Ultrasound Interpretation, Peripheral Nerve Block    1. Under ultrasound guidance, the needle was inserted and placed in close proximity to the target nerve(s).  2. Ultrasound was also used to visualize the spread of the anesthetic in close proximity to the nerve(s) being blocked.  Local anesthetic was administered in incremental doses, with intermittent negative aspiration.    3. The nerve(s) appeared anatomically normal.  4. There were no apparent abnormal pathological findings.  5. A  permanent ultrasound image was saved in the patient's record.    Patient tolerated procedure well   No complications.    The surgeon has given a verbal order transferring care of this patient to me for the performance of a regional analgesia block for post-op pain control. It is requested of me because I am uniquely trained and qualified to perform this block and the surgeon is neither trained nor qualified to perform this procedure.    Pt tolerated well.    No complications.      The surgeon has given a verbal order transferring care of this patient to me for the performance of a regional analgesia block for post-op pain control. It is requested of me because I am uniquely trained and qualified to perform this block and the surgeon is neither trained nor qualified to perform this procedure.

## 2020-11-27 NOTE — ANESTHESIA CARE TRANSFER NOTE
Patient: Johny Pisano    Procedure(s):  LEFT TOTAL KNEE ARTHROPLASTY    Diagnosis: Left knee DJD [M17.12]  Diagnosis Additional Information: No value filed.    Anesthesia Type:   Spinal     Note:  Airway :Face Mask  Patient transferred to:PACU  Comments: Pt exhibits spont resps, all monitors and alarms on in pacu, report given to RN, vss.Handoff Report: Identifed the Patient, Identified the Reponsible Provider, Reviewed the pertinent medical history, Discussed the surgical course, Reviewed Intra-OP anesthesia mangement and issues during anesthesia, Set expectations for post-procedure period and Allowed opportunity for questions and acknowledgement of understanding      Vitals: (Last set prior to Anesthesia Care Transfer)    CRNA VITALS  11/27/2020 0925 - 11/27/2020 1001      11/27/2020             Pulse:  75    SpO2:  98 %    Resp Rate (set):  10                Electronically Signed By: AMY Livingston CRNA  November 27, 2020  10:01 AM

## 2020-11-27 NOTE — BRIEF OP NOTE
[facility]    Brief Operative Note    Pre-operative diagnosis: Left knee DJD [M17.12]  Post-operative diagnosis Same as pre-operative diagnosis    Procedure: Procedure(s):  LEFT TOTAL KNEE ARTHROPLASTY  Surgeon: Surgeon(s) and Role:     * Mannie Lopes MD - Primary     * Aranza Will PA-C - Assisting  Anesthesia: Spinal   Estimated blood loss: 25  Drains: Hemovac  Specimens: * No specimens in log *  Findings:   None.  Complications: None.  Implants:   Implant Name Type Inv. Item Serial No.  Lot No. LRB No. Used Action   BONE CEMENT RADIOPAQUE SIMPLEX HV FULL DOSE 6194-1-001 Cement, Bone BONE CEMENT RADIOPAQUE SIMPLEX HV FULL DOSE 6194-1-001  [employer] [id number] Left 2 Implanted   IMP COMP PATELLA SNN GEN II RESURFACING 38MM [id number] Total Joint Component/Insert IMP COMP PATELLA SNN GEN II RESURFACING 38MM [id number]  [employer]  [id number] Left 1 Implanted   IMP BASEPLATE TIBIAL LALITO II SZ 7 LT TI [id number] Total Joint Component/Insert IMP BASEPLATE TIBIAL LALITO II SZ 7 LT TI [id number]  [employer]  [id number] Left 1 Implanted   IMP COMP FEMORAL S&N LEGION PS OXIN SZ8 LT [id number] Total Joint Component/Insert IMP COMP FEMORAL S&N LEGION PS OXIN SZ8 LT [id number]  [employer]  [id number] Left 1 Implanted   IMP COMP KNEE S&N LEGION PS HI-FLEX XLPE SZ7-8 10MM [id number] Total Joint Component/Insert IMP COMP KNEE S&N LEGION PS HI-FLEX XLPE SZ7-8 10MM [id number]  [employer]  [id number] Left 1 Implanted

## 2020-11-27 NOTE — PROGRESS NOTES
11/27/20 1453   Quick Adds   Type of Visit Initial PT Evaluation   Living Environment   People in home spouse  (Yesica)   Current Living Arrangements house   Home Accessibility stairs to enter home;stairs within home   Number of Stairs, Main Entrance 2  (Grab bar)   Number of Stairs, Within Home, Primary 8   Stair Railings, Within Home, Primary railing on left side (ascending)   Transportation Anticipated car, drives self;family or friend will provide   Living Environment Comments Pt's spouse is able to assist at time of discharge.    Self-Care   Usual Activity Tolerance good   Current Activity Tolerance moderate   Regular Exercise Yes   Activity/Exercise Type strength training;biking  (Pre-op book exercises)   Equipment Currently Used at Home none   Disability/Function   Fall history within last six months yes   Number of times patient has fallen within last six months 1   General Information   Onset of Illness/Injury or Date of Surgery 11/27/20   Referring Physician Mannie Lopes MD   Patient/Family Therapy Goals Statement (PT) Return home.    Pertinent History of Current Problem (include personal factors and/or comorbidities that impact the POC) 64 y/o male POD # 0 L TKA.    Existing Precautions/Restrictions fall   Weight-Bearing Status - LLE weight-bearing as tolerated   General Observations Pt in supine upon arrival of therapist.    Cognition   Orientation Status (Cognition) oriented x 3   Affect/Mental Status (Cognition) WFL   Follows Commands (Cognition) WFL   Pain Assessment   Patient Currently in Pain   (L knee pain at rest: 6/10)   Integumentary/Edema   Integumentary/Edema Comments L knee incision covered with dressing.    Range of Motion (ROM)   ROM Comment L knee ROM: 12-92 degrees, otherwise B LEs WFL.    Strength   Strength Comments Not formally assessed, pt demonstrates at least 3/5 grossly in B LEs. Pt demonstrated sufficient L LE strength to complete SLR independently.   Bed Mobility    Comment (Bed Mobility) Supine <> sit with SBA.    Transfers   Transfer Safety Comments Sit <> stand with FWW and CGA.    Gait/Stairs (Locomotion)   Comment (Gait/Stairs) Pt amb 5' with FWW and CGA.    Balance   Balance Comments Noted good seated and standing balance at FWW.    Sensory Examination   Sensory Perception Comments Pt reports numbness in B feet d/t neuropathy at baseline.    Clinical Impression   Criteria for Skilled Therapeutic Intervention yes, treatment indicated   PT Diagnosis (PT) Difficulty with gait.    Influenced by the following impairments Pain, Impaired L knee ROM, Decreased strength, Decreased activity tolerance   Functional limitations due to impairments Limited functional mobility requiring AD and assist.    Clinical Presentation Stable/Uncomplicated   Clinical Presentation Rationale Based on PMH, current presentation, and social support.    Clinical Decision Making (Complexity) low complexity   Therapy Frequency (PT) 2x/day   Predicted Duration of Therapy Intervention (days/wks) 2 days   Planned Therapy Interventions (PT) bed mobility training;gait training;ROM (range of motion);stair training;strengthening;transfer training   Anticipated Equipment Needs at Discharge (PT) walker, rolling   Risk & Benefits of therapy have been explained patient   PT Discharge Planning    PT Rationale for DC Rec Anticipate pt will require SBA for transfers and ambulation with use of FWW, CGA to navigate stairs    Total Evaluation Time   Total Evaluation Time (Minutes) 5

## 2020-11-27 NOTE — ANESTHESIA PROCEDURE NOTES
Procedure note : intrathecal      Staff -   Anesthesiologist:  Teodoro Hahn MD  Performed By: anesthesiologist  Pre-Procedure  Performed by Teodoro Hahn MD  Location: OR      Pre-Anesthestic Checklist: patient identified, IV checked, site marked, risks and benefits discussed, informed consent, monitors and equipment checked and pre-op evaluation    Timeout  Correct Patient: Yes   Correct Procedure: Yes   Correct Site: Yes   Correct Laterality: N/A   Correct Position: Yes   Site Marked: N/A   .   Procedure Documentation  ASA 3  .    Procedure: intrathecal, .   Patient Position:sitting  (midline approach)     Patient Prep/Sterile Barriers; mask, sterile gloves, povidone-iodine 7.5% surgical scrub, patient draped.  .  Needle:  Spinal Needle (gauge): 25  Spinal/LP Needle Length (inches): 3.5 # of attempts: 1 and # of redirects: : none. Introducer used Introducer: 20 G .        Assessment/Narrative  Paresthesias: No.  .  .  clear CSF fluid removed . Comments:  Patient tolerated procedure well   No complications  Spinal injected with 15 mg hyperbaric marcaine

## 2020-11-27 NOTE — ANESTHESIA POSTPROCEDURE EVALUATION
Patient: Johny Pisano    Procedure(s):  LEFT TOTAL KNEE ARTHROPLASTY    Diagnosis:Left knee DJD [M17.12]  Diagnosis Additional Information: No value filed.    Anesthesia Type:  Spinal    Note:  Anesthesia Post Evaluation    Patient location during evaluation: PACU  Patient participation: Able to participate in evaluation but full recovery from regional anesthesia has not yet ocurrred but is anticipated to occur within 48 hours  Level of consciousness: awake  Pain management: adequate  Airway patency: patent  Cardiovascular status: acceptable  Respiratory status: acceptable  Hydration status: acceptable  PONV: none     Anesthetic complications: None          Last vitals:  Vitals:    11/27/20 1245 11/27/20 1345 11/27/20 1541   BP: 122/64 120/65 (!) 157/72   Pulse: 61 62 66   Resp:   16   Temp:   36.6  C (97.8  F)   SpO2: 97% 97% 94%         Electronically Signed By: Teodoro Hahn MD  November 27, 2020  3:45 PM

## 2020-11-28 ENCOUNTER — APPOINTMENT (OUTPATIENT)
Dept: PHYSICAL THERAPY | Facility: CLINIC | Age: 65
End: 2020-11-28
Attending: ORTHOPAEDIC SURGERY
Payer: COMMERCIAL

## 2020-11-28 VITALS
OXYGEN SATURATION: 98 % | DIASTOLIC BLOOD PRESSURE: 47 MMHG | WEIGHT: 293 LBS | HEIGHT: 73 IN | BODY MASS INDEX: 38.83 KG/M2 | TEMPERATURE: 97.8 F | SYSTOLIC BLOOD PRESSURE: 95 MMHG | RESPIRATION RATE: 16 BRPM | HEART RATE: 61 BPM

## 2020-11-28 LAB
GLUCOSE BLDC GLUCOMTR-MCNC: 114 MG/DL (ref 70–99)
HGB BLD-MCNC: 10.6 G/DL (ref 13.3–17.7)

## 2020-11-28 PROCEDURE — 250N000013 HC RX MED GY IP 250 OP 250 PS 637: Performed by: ORTHOPAEDIC SURGERY

## 2020-11-28 PROCEDURE — 97116 GAIT TRAINING THERAPY: CPT | Mod: GP

## 2020-11-28 PROCEDURE — 250N000009 HC RX 250: Performed by: ORTHOPAEDIC SURGERY

## 2020-11-28 PROCEDURE — 85018 HEMOGLOBIN: CPT | Performed by: ORTHOPAEDIC SURGERY

## 2020-11-28 PROCEDURE — 999N001017 HC STATISTIC GLUCOSE BY METER IP

## 2020-11-28 PROCEDURE — 36415 COLL VENOUS BLD VENIPUNCTURE: CPT | Performed by: ORTHOPAEDIC SURGERY

## 2020-11-28 PROCEDURE — 97110 THERAPEUTIC EXERCISES: CPT | Mod: GP

## 2020-11-28 PROCEDURE — 250N000011 HC RX IP 250 OP 636: Performed by: ORTHOPAEDIC SURGERY

## 2020-11-28 PROCEDURE — 97530 THERAPEUTIC ACTIVITIES: CPT | Mod: GP

## 2020-11-28 RX ORDER — OXYCODONE HYDROCHLORIDE 5 MG/1
5 TABLET ORAL
Status: DISCONTINUED | OUTPATIENT
Start: 2020-11-28 | End: 2020-11-28 | Stop reason: HOSPADM

## 2020-11-28 RX ORDER — OXYCODONE HYDROCHLORIDE 5 MG/1
10 TABLET ORAL
Status: DISCONTINUED | OUTPATIENT
Start: 2020-11-28 | End: 2020-11-28 | Stop reason: HOSPADM

## 2020-11-28 RX ADMIN — ACETAMINOPHEN 975 MG: 325 TABLET, FILM COATED ORAL at 08:00

## 2020-11-28 RX ADMIN — DICYCLOMINE HYDROCHLORIDE 20 MG: 20 TABLET ORAL at 08:00

## 2020-11-28 RX ADMIN — HYDROMORPHONE HYDROCHLORIDE 0.4 MG: 1 INJECTION, SOLUTION INTRAMUSCULAR; INTRAVENOUS; SUBCUTANEOUS at 05:14

## 2020-11-28 RX ADMIN — POLYETHYLENE GLYCOL 3350 17 G: 17 POWDER, FOR SOLUTION ORAL at 08:00

## 2020-11-28 RX ADMIN — OXYCODONE HYDROCHLORIDE 10 MG: 5 TABLET ORAL at 07:23

## 2020-11-28 RX ADMIN — DOCUSATE SODIUM 100 MG: 100 CAPSULE, LIQUID FILLED ORAL at 08:01

## 2020-11-28 RX ADMIN — PRIMIDONE 300 MG: 50 TABLET ORAL at 09:41

## 2020-11-28 RX ADMIN — METFORMIN HYDROCHLORIDE 1000 MG: 500 TABLET, FILM COATED ORAL at 08:00

## 2020-11-28 RX ADMIN — DOCUSATE SODIUM 50 MG AND SENNOSIDES 8.6 MG 1 TABLET: 8.6; 5 TABLET, FILM COATED ORAL at 08:00

## 2020-11-28 RX ADMIN — OXYCODONE HYDROCHLORIDE 10 MG: 5 TABLET ORAL at 03:03

## 2020-11-28 RX ADMIN — ONDANSETRON 4 MG: 2 INJECTION INTRAMUSCULAR; INTRAVENOUS at 00:41

## 2020-11-28 RX ADMIN — OXYCODONE HYDROCHLORIDE 10 MG: 5 TABLET ORAL at 10:32

## 2020-11-28 RX ADMIN — HYDROXYZINE HYDROCHLORIDE 25 MG: 25 TABLET, FILM COATED ORAL at 05:14

## 2020-11-28 RX ADMIN — PREGABALIN 150 MG: 150 CAPSULE ORAL at 08:00

## 2020-11-28 RX ADMIN — CEFAZOLIN SODIUM 2 G: 2 INJECTION, SOLUTION INTRAVENOUS at 03:03

## 2020-11-28 RX ADMIN — ASPIRIN 325 MG: 325 TABLET, COATED ORAL at 08:00

## 2020-11-28 NOTE — PLAN OF CARE
Patient states he feels he has cold sweat and tremors. Patient states he has baseline tremors  And he thinks tylenol makes his stomach sick. Writer educate patient stomach pain can cause from taking pain medication without  adequate food intake .  BS check and administer extra carb snacks provided him the shakiness stops

## 2020-11-28 NOTE — PLAN OF CARE
Neuro- stable, CMS intact   Most Recent Vitals- Temp: 97.7  F (36.5  C) Temp src: Oral BP: 103/53 Pulse: 67   Resp: 14 SpO2: 94 % O2 Device: Nasal cannula Oxygen Delivery: 2 LPM  Tele/Cardiac- NA  Resp- 2L NC  Activity- SBA to BR  Pain- PRN  Drips- NA  Drains/Tubes- NA  Skin- incesion  GI/- voiding  Aggression Color- green  COVID status- negative  Plan- TBD  Misc- Patient pod 1 A&O VSS pain control has been issue writer educate the client to decrease narcotic usage and increase the ice and non narctoic pain medication. Patint continously asking all his medication so he make suure he is not feeling the pin, Although he has major surgery patient was able to sleep in between cares and unable to justify the intense of his pain while he is distracted. Writer spend most of time educate appropriate use of pain medication patient express his understanding .    Kaylie Herrera RN

## 2020-11-28 NOTE — PROGRESS NOTES
Discharge goals:  ~ Patient vital signs are at baseline: met  ~ Patient able to ambulate as they were prior to admission or with assist devices provided by therapies during their stay: met   ~ Patient MUST void prior to discharge: met  ~ Patient able to tolerate oral intake to maintain hydration status: met   ~ Patient has adequate pain control using Oral analgesics: met   ~ Hypercapnia, hypoventilation or hypoxia resolved for at least 2 hours without supplemental oxygen: met  ~ Deficits in sensation, mobility or coordination have resolved if spinal or regional anesthesia was used: met  ~ Patient has returned to baseline mental status: met    Reviewed AVS with pt, discharge medications given, discharging home with spouse.

## 2020-11-28 NOTE — PROGRESS NOTES
POD#1 L TKA    Doing well  Pain well managed  AVSS  CMS intact  Dressing CDI  Calf soft and nontender  XRay looks good  Home today after PT  Aspirin  Follow-up with me in 2 weeks    Mannie Lopes MD

## 2020-11-30 NOTE — DISCHARGE SUMMARY
Discharge Summary    Johny Pisano MRN# [id number]   YOB: 1955 Age: 65 year old     Date of Admission:  11/27/2020  Date of Discharge:  11/28/2020  Admitting Physician:  Mannie Lopes MD  Discharge Physician:  Mannie Lopes MD     Primary Provider: Kedar Horton1          Admission Diagnoses:   Osteoarthritis left knee          Discharge Diagnosis:   Same           Surgical Procedure:   Total knee arthroplasty           Discharge Disposition:   Discharged to home           Medications Prior to Admission:     No medications prior to admission.             Discharge Medications:     Discharge Medication List as of 11/28/2020 10:27 AM      START taking these medications    Details   acetaminophen (TYLENOL) 325 MG tablet Take 2 tablets (650 mg) by mouth every 4 hours as needed for other (mild pain), Disp-100 tablet, R-0, E-Prescribe      hydrOXYzine (ATARAX) 25 MG tablet Take 1 tablet (25 mg) by mouth every 6 hours as needed for itching or anxiety (with pain, moderate pain), Disp-30 tablet, R-0, E-Prescribe      oxyCODONE (ROXICODONE) 5 MG tablet Take 1-2 tablets (5-10 mg) by mouth every 3 hours as needed for pain (Moderate to Severe), Disp-50 tablet, R-0, Local Print      senna-docusate (SENOKOT-S/PERICOLACE) 8.6-50 MG tablet Take 1-2 tablets by mouth 2 times daily Take while on oral narcotics to prevent or treat constipation., Disp-30 tablet, R-0, E-PrescribeWhile taking narcotics         CONTINUE these medications which have CHANGED    Details   aspirin (ASA) 325 MG EC tablet Take 1 tablet (325 mg) by mouth 2 times daily, Disp-60 tablet, R-0, E-Prescribe         CONTINUE these medications which have NOT CHANGED    Details   cetirizine (ZYRTEC) 10 MG tablet Take 10 mg by mouth every evening, Historical      cyanocobalamin (CYANOCOBALAMIN) 1000 MCG/ML injection Inject 1 mL into the muscle every 30 days, Historical      dicyclomine (BENTYL) 20 MG tablet Take 20 mg by mouth every morning,  Historical      docusate sodium (COLACE) 100 MG tablet Take 100 mg by mouth daily, Historical      Lisinopril (ZESTRIL PO) Take 10 mg by mouth daily , Historical      metFORMIN (GLUCOPHAGE) 1000 MG tablet Take 1,000 mg by mouth 2 times daily (with meals) , Historical      Multiple Vitamins-Minerals (MULTIVITAMIN & MINERAL PO) Take 1 tablet by mouth daily , Historical      pregabalin (LYRICA) 150 MG capsule Take 150 mg by mouth 2 times daily, Historical      !! primidone (MYSOLINE) 250 MG tablet Take 250 mg by mouth 2 times daily (in addition to 50mg tablet = 300mg dose), Historical      !! primidone (MYSOLINE) 50 MG tablet Take 50 mg by mouth 2 times daily (in addition to 250mg tablet = 300mg dose), Historical      PSYLLIUM PO Take 1 Tablespoonful by mouth 2 times daily, Historical      tamsulosin (FLOMAX) 0.4 MG capsule Take 0.8 mg by mouth every evening (2 x 0.4mg), Historical      Vitamin D (Cholecalciferol) 25 MCG (1000 UT) CAPS Take 1,000 Units by mouth every evening , Historical       !! - Potential duplicate medications found. Please discuss with provider.      STOP taking these medications       ibuprofen (ADVIL/MOTRIN) 200 MG tablet Comments:   Reason for Stopping:                     Consultations:   No consultations were requested during this admission           Hospital Course:   The patient was admitted after the surgical procedure.The patient underwent an uneventful total knee arthroplasty. Postoperatively, anticoagulation with aspirin was initiated. Home medications have been reconciled. Oxycodone 5 mg. was prescribed for pain.             Discharge Instructions and Follow-Up:        Discharge activity: WBAT with ambulatory device.   Discharge follow-up: Follow-up with Aranza Will PA-C in ~14 days post-op. [phone]   Outpatient therapy: Should already be arranged by office.  If not, patient should call to schedule 2x/week x 3 weeks.   Home Care agency: None   Supplies and equipment: None         Wound care: Daily dry dressing changes.  May use adaptic/xeroform directly over incision if available.  Staples out 12 days post-op and apply steri-strips.    Other instructions: Knee immobilizer on at night for 1 month.  Please discharge patient with immobilizer.       Aranza Will PA-C

## 2022-10-25 ENCOUNTER — TRANSFERRED RECORDS (OUTPATIENT)
Dept: HEALTH INFORMATION MANAGEMENT | Facility: CLINIC | Age: 67
End: 2022-10-25

## 2022-12-13 ENCOUNTER — HOSPITAL ENCOUNTER (OUTPATIENT)
Facility: CLINIC | Age: 67
End: 2022-12-13
Attending: ORTHOPAEDIC SURGERY | Admitting: ORTHOPAEDIC SURGERY
Payer: COMMERCIAL

## 2023-02-06 ENCOUNTER — MEDICAL CORRESPONDENCE (OUTPATIENT)
Dept: HEALTH INFORMATION MANAGEMENT | Facility: CLINIC | Age: 68
End: 2023-02-06
Payer: COMMERCIAL

## 2023-02-07 ENCOUNTER — TELEPHONE (OUTPATIENT)
Dept: NEUROLOGY | Facility: CLINIC | Age: 68
End: 2023-02-07
Payer: COMMERCIAL

## 2023-02-07 NOTE — TELEPHONE ENCOUNTER
Spoke to the patient to schedule a Deep Brain Stimulation consultation with a Movement Disorder provider for Essential tremor. The patient was offered to be seen at [place], [place], or [place]. The patient chose [facility].    The patient was scheduled with Dr. Ismael Monterroso on 3/8/23 at 2:30 PM.

## 2023-02-08 ENCOUNTER — TRANSCRIBE ORDERS (OUTPATIENT)
Dept: OTHER | Age: 68
End: 2023-02-08

## 2023-02-08 DIAGNOSIS — G25.0 ESSENTIAL TREMOR: Primary | ICD-10-CM

## 2023-03-08 ENCOUNTER — OFFICE VISIT (OUTPATIENT)
Dept: NEUROLOGY | Facility: CLINIC | Age: 68
End: 2023-03-08
Payer: COMMERCIAL

## 2023-03-08 ENCOUNTER — TELEPHONE (OUTPATIENT)
Dept: NEUROLOGY | Facility: CLINIC | Age: 68
End: 2023-03-08

## 2023-03-08 VITALS — SYSTOLIC BLOOD PRESSURE: 139 MMHG | DIASTOLIC BLOOD PRESSURE: 82 MMHG | HEART RATE: 40 BPM | OXYGEN SATURATION: 95 %

## 2023-03-08 DIAGNOSIS — G25.0 ESSENTIAL TREMOR: ICD-10-CM

## 2023-03-08 PROCEDURE — 99417 PROLNG OP E/M EACH 15 MIN: CPT | Performed by: PSYCHIATRY & NEUROLOGY

## 2023-03-08 PROCEDURE — 99205 OFFICE O/P NEW HI 60 MIN: CPT | Performed by: PSYCHIATRY & NEUROLOGY

## 2023-03-08 ASSESSMENT — ACTIVITIES OF DAILY LIVING (ADL)
WORKING: (0) NORMAL
POURING: (3) MODERATELY ABNORMAL. MUST USE TWO HANDS OR USES OTHER STRATEGIES TO AVOID SPILLING.
TOTAL_SCORE: 28
FEEDING_WITH_A_SPOON: (3) MODERATELY ABNORMAL. SPILLS A LOT OR CHANGES STRATEGY TO COMPLETE TASK SUCH AS USING TWO HANDS OR LEANING OVER.
HYGIENE: (3) MODERATELY ABNORMAL. UNABLE TO DO MOST FINE TASKS SUCH AS PUTTING ON LIPSTICK OR SHAVING UNLESS CHANGES STRATEGY SUCH AS USING TWO HANDS OR USING THE LESS AFFECTED HAND.
CARRYING_FOOD_TRAYS_PLATES_OR_SIMILAR_ITEMS: (3) MODERATELY ABNORMAL. USES STRATEGIES SUCH AS HOLDING TIGHTLY AGAINST BODY TO CARRY.
DRESS: (2) MILDLY ABNORMAL. ABLE TO DO EVERYTHING BUT HAS DIFFICULTY DUE TO TREMOR.
SOCIAL_IMPACT: (3) AVOIDS PARTICIPATING IN SOME SOCIAL SITUATIONS OR PROFESSIONAL MEETINGS BECAUSE OF TREMOR.
USING_KEYS: (2) MILDLY ABNORMAL. COMMONLY MISSES TARGET BUT STILL ROUTINELY PUTS KEY IN LOCK WITH ONE HAND.
DRINKING_FROM_A_GLASS: (3) MODERATELY ABNORMAL. SPILLS A LOT OR CHANGES STRATEGY TO COMPLETE TASK SUCH AS USING TWO HANDS OR LEANING OVER.
SPEAKING: (0) NORMAL
OVERALL_DISABILITY_WITH_THE_MOST_AFFECTED_TASK: (3) MODERATELY ABNORMAL. CAN DO TASK BUT MUST USE STRATEGIES.
WRITING: (3) MODERATELY ABNORMAL. CANNOT WRITE WITHOUT USING STRATEGIES SUCH AS HOLDING THE WRITING HAND WITH THE OTHER HAND, HOLDING PEN DIFFERENTLY OR USING LARGE PEN.

## 2023-03-08 ASSESSMENT — PATIENT HEALTH QUESTIONNAIRE - PHQ9: SUM OF ALL RESPONSES TO PHQ QUESTIONS 1-9: 7

## 2023-03-08 ASSESSMENT — PAIN SCALES - GENERAL: PAINLEVEL: NO PAIN (0)

## 2023-03-08 NOTE — TELEPHONE ENCOUNTER
Emailed Video Consent to [place] (scan to email)  [email or web address]      Marie Rojas MA

## 2023-03-08 NOTE — PROGRESS NOTES
Department of Neurology  Movement Disorders Division   New Patient Visit    Patient: Johny Pisano   MRN: [id number]   : 1955   Date of Visit: 3/8/2023  PCP: Orquidea Horton   Referring provider: Юлия    CC:  Tremor disorder, DBS evaluation    HPI:  Mr. Pisano is a 67 year old R-handed male with history significant for DM, with diabetic neuropathy who presents to movement disorder clinic for tremor evaluation, consideration for DBS.   He endorses tremors as far back as 35 years ago or so, affecting his hands equally, mostly with manual tasks, and none at rest. The tremors have been progressing over the years to the point he struggles to perform tings that require precision such as pushing buttons and keys on a keyboard, writing and filling out forms. He has difficulties carrying a dish in his hand, he no longer fills a cup completely, and tends to have a lid on and a straw. He often uses a spoon rather than a fork. He also struggles with personal hygiene such as shaving, even with an electric shaver he struggles, and some aspects of clothing such as buttoning and tieing shoelaces. Even for some hobbies such as threading a line into a hook while fishing causes problems.   He does experience some tremors in his voice, and he says his wife has pointed out tremors in his head. At times even his stomach seems to be shaking. Denies tremors affecting his legs.   He denies any cognitive changes. He has noticed some balance problems, associated with higher doses of primidone.  He denies posturing, he denies rigidity or bradykinesia.  He has been diagnosed with essential tremor by his local providers. Initially he was started on propranolol which helped for a little while and then lost efficacy. He was transitioned to primidone, which again worked and then lost efficacy. He is currently on 500mg AM and 250mg PM, which seems to cause some somnolence and higher doses than that caused significant somnolence.  He has attempted to resume propranolol in combination with primidone which also has not been effective.  His mother had tremors late in life. He also has an uncle who had PD. He is retired form being a , he also was a . He denies chronic intake of dopamine blocking agents.  Given failure of meds as described above, he is coming here for opinion on the case regarding DBS candidacy.    Tremor ADL Scale 3/8/2023   1. Speaking 0   2. Feeding (spoon) 3   3. Drinking (glass) 3   4. Hygiene 3   5. Dressing 2   6. Pouring 3   7. Carry plate, tray 3   8. Use keys 2   9. Writing 3   10. Work or housework 0   11a. Overall disability 3   11b. Most affected task Carrying objects in his hand   12. Social impact 3   ADL TOTAL 28         ROS:  All others negative except as listed above. Pertinent movement disorders-specific ROS listed above.    Past Medical History:   Diagnosis Date     Acute insomnia      Benign essential tremor      BPH (benign prostatic hyperplasia)      Depression      Diabetes mellitus, type 2 (H)      Diabetic foot ulcers (H)      Diabetic ulcer of toe (H)      HTN (hypertension)      Hx of basal cell carcinoma      Hyperlipidemia      IBS (irritable bowel syndrome)      Neuropathy      Obesity      Obesity      Right knee pain      Snores      Tremor      Tubular adenoma of colon      Vitamin B12 deficiency         Past Surgical History:   Procedure Laterality Date     ARTHROPLASTY KNEE Left 11/27/2020    Procedure: LEFT TOTAL KNEE ARTHROPLASTY;  Surgeon: Mannie Lopes MD;  Location:  OR     ARTHROSCOPY KNEE WITH MEDIAL MENISCECTOMY  10/24/2013    Procedure: ARTHROSCOPY KNEE WITH MEDIAL MENISCECTOMY;  RIGHT KNEE ARTHROSCOPY, PARTIAL MEDIAL MENISCECTOMY, Patella Chrondoplasty  ;  Surgeon: Mannie Lopes MD;  Location:  SD     ATHROSCOPIC KNEE SURGERY[       FISTULOTOMY RECTUM       JOINT REPLACEMENT       NASAL SEPTIM SURGERY[       REPAIR TENDON ACHILLES             Current Outpatient Medications:      acetaminophen (TYLENOL) 325 MG tablet, Take 2 tablets (650 mg) by mouth every 4 hours as needed for other (mild pain), Disp: 100 tablet, Rfl: 0     aspirin (ASA) 325 MG EC tablet, Take 1 tablet (325 mg) by mouth 2 times daily, Disp: 60 tablet, Rfl: 0     cetirizine (ZYRTEC) 10 MG tablet, Take 10 mg by mouth every evening, Disp: , Rfl:      cyanocobalamin (CYANOCOBALAMIN) 1000 MCG/ML injection, Inject 1 mL into the muscle every 30 days, Disp: , Rfl:      dicyclomine (BENTYL) 20 MG tablet, Take 20 mg by mouth every morning, Disp: , Rfl:      docusate sodium (COLACE) 100 MG tablet, Take 100 mg by mouth daily, Disp: , Rfl:      hydrOXYzine (ATARAX) 25 MG tablet, Take 1 tablet (25 mg) by mouth every 6 hours as needed for itching or anxiety (with pain, moderate pain), Disp: 30 tablet, Rfl: 0     Lisinopril (ZESTRIL PO), Take 10 mg by mouth daily , Disp: , Rfl:      metFORMIN (GLUCOPHAGE) 1000 MG tablet, Take 1,000 mg by mouth 2 times daily (with meals) , Disp: , Rfl:      Multiple Vitamins-Minerals (MULTIVITAMIN & MINERAL PO), Take 1 tablet by mouth daily , Disp: , Rfl:      oxyCODONE (ROXICODONE) 5 MG tablet, Take 1-2 tablets (5-10 mg) by mouth every 3 hours as needed for pain (Moderate to Severe), Disp: 50 tablet, Rfl: 0     pregabalin (LYRICA) 150 MG capsule, Take 150 mg by mouth 2 times daily, Disp: , Rfl:      primidone (MYSOLINE) 250 MG tablet, Take 250 mg by mouth 2 times daily (in addition to 50mg tablet = 300mg dose), Disp: , Rfl:      primidone (MYSOLINE) 50 MG tablet, Take 50 mg by mouth 2 times daily (in addition to 250mg tablet = 300mg dose), Disp: , Rfl:      PSYLLIUM PO, Take 1 Tablespoonful by mouth 2 times daily, Disp: , Rfl:      senna-docusate (SENOKOT-S/PERICOLACE) 8.6-50 MG tablet, Take 1-2 tablets by mouth 2 times daily Take while on oral narcotics to prevent or treat constipation., Disp: 30 tablet, Rfl: 0     tamsulosin (FLOMAX) 0.4 MG capsule, Take 0.8 mg  by mouth every evening (2 x 0.4mg), Disp: , Rfl:      Vitamin D (Cholecalciferol) 25 MCG (1000 UT) CAPS, Take 1,000 Units by mouth every evening , Disp: , Rfl:      Allergies   Allergen Reactions     Hmg-Coa-R Inhibitors Other (See Comments)     Other Drug Allergy (See Comments) Muscle Pain (Myalgia)     Penicillins Other (See Comments)     Swelling at site of injection        No family history on file.     Social History:  He  reports that he quit smoking about 9 years ago. His smoking use included cigars. He has quit using smokeless tobacco.  His smokeless tobacco use included chew. He reports current alcohol use. He reports that he does not use drugs.     PHYSICAL EXAM:  /82   Pulse (!) 40   SpO2 95%      Gen: no acute distress, respirations appeared nonlabored    NEURO:  MS:  Alert, oriented, appropriate    CN:  PERRLA, EOMI, face symmetric, normal strength and sensation, tongue and palate are midline, SCM 5/5 throughout    Motor:    Normal tone, no fasciculations, strength is 5/5 throughout    Sensation:  Reduced in a stock/glove pattern to above wrists and above ankles to temperature and vibration    Coordination:  Normal finger-nose    Reflexes: trace throughout, with equivocal toes bilaterally    Gait:  Slightly wide based, good arm swing.    Tremor Motor Scale 3/8/2023   Assessment Time  5:40 PM   Medication On   DBS - Right Brain None   DBS - Left Brain None   Head 1.5   Face & Jaw 0   Voice 0.5   Outstretched - RIGHT 2   Outstretched - LEFT 2   Wingbeating - RIGHT 0   Wingbeating - LEFT 0   Kinetic - RIGHT 2.5   Kinetic - LEFT 2.5   Lower Limb - RIGHT 0   Lower Limb - LEFT 0   Lower Limb (Max) 0   Spiral - RIGHT 2   Spiral - LEFT 2   Handwriting 3   Dot approx - RIGHT 2.5   Dot approx - LEFT 2.5   Trunk (Standing) 0   Total Right 9   Total Left 9   Axial 2   TOTAL 23       DATA REVIEWED:  Reviewed most recent A1C from back in , was 7.4 (preferred to be below 8 by our neurosurgery  team)    ASSESSMENT:  68 yo M with a longstanding tremor disorder affecting bilateral hands. Tremors are postural and very kinetic in nature, without parkinsonian or dystonic features, aspects of if confirmed on examination today. I do suspect he has a pure essential tremor disorder, that is at this point very disabling and refractory to medications.    PLAN:  - Reviewed impression and plan  - Discussed aspects of DBS surgery, provided education on the topic, reviewed risks, benefit, outpatient programming, timeline of events.   - Reviewed also different devices, expectations, reviewed what DBS works for and what it does not do. He has very reasonable and achievable goes which are    1- reduce tremors in hands and be able to perform iADLs more easily   2- potentially reduce and discontinue medications altogether (i.e. primidone and propranolol), since they seem to be providing side effects.  - Reviewed also the interdisciplinary process. We extended the visit today and performed the standardized scale and videotaping required for his preop assessment so we can forego a dedicated visit for that so we can expedite the process  - Placed neuropsych, MRI and neurosurgery consultations today, and notified our DBS coordinator to proceed with scheduling  - He is interested in moving forward with DBS, however he did share that he is relocating to [place] ([facility]) hopefully by the fall of next year, and he would likely try to transition his care to [facility] in [place], which he was planning on being seen as soon as he moved. We can help facilitating that process when the time comes.  - RTC with me pending surgical dates and dates set aside for programming if he is deemed a candidate for neuromodulatory therapy. He was encouraged to call if questions, concerns, or changes in the meantime.     There are no Patient Instructions on file for this visit.      Ismael De Luna MD (Leo)  (he/him/his)   of Neurology  [facility]  Department of Neurology      Thank you for referring Johny Pisano to our [facility] Movement Disorders Program, we appreciate the opportunity of being your partner in healthcare. Please feel free to reach out to us at any point if any questions or concerns about this visit.    Attending attestation: Today I spent a total 110 minutes on this case, with greater than 50% of the time spent in face-to-face, examining, obtaining history, providing education and coordination of care. Additional time was spent in chart review, ancillary data, and documentation as delineated above.

## 2023-03-08 NOTE — LETTER
3/8/2023         RE: Johny Pisano  [address]   [place] [address]        Dear Colleague,    Thank you for referring your patient, Johny Pisano, to the [facility]. Please see a copy of my visit note below.    Department of Neurology  Movement Disorders Division   New Patient Visit    Patient: Johny Pisano   MRN: [id number]   : 1955   Date of Visit: 3/8/2023  PCP: Orquidea Horton   Referring provider: Юлия    CC:  Tremor disorder, DBS evaluation    HPI:  Mr. Pisano is a 67 year old R-handed male with history significant for DM, with diabetic neuropathy who presents to movement disorder clinic for tremor evaluation, consideration for DBS.   He endorses tremors as far back as 35 years ago or so, affecting his hands equally, mostly with manual tasks, and none at rest. The tremors have been progressing over the years to the point he struggles to perform tings that require precision such as pushing buttons and keys on a keyboard, writing and filling out forms. He has difficulties carrying a dish in his hand, he no longer fills a cup completely, and tends to have a lid on and a straw. He often uses a spoon rather than a fork. He also struggles with personal hygiene such as shaving, even with an electric shaver he struggles, and some aspects of clothing such as buttoning and tieing shoelaces. Even for some hobbies such as threading a line into a hook while fishing causes problems.   He does experience some tremors in his voice, and he says his wife has pointed out tremors in his head. At times even his stomach seems to be shaking. Denies tremors affecting his legs.   He denies any cognitive changes. He has noticed some balance problems, associated with higher doses of primidone.  He denies posturing, he denies rigidity or bradykinesia.  He has been diagnosed with essential tremor by his local providers. Initially he was started on propranolol  which helped for a little while and then lost efficacy. He was transitioned to primidone, which again worked and then lost efficacy. He is currently on 500mg AM and 250mg PM, which seems to cause some somnolence and higher doses than that caused significant somnolence. He has attempted to resume propranolol in combination with primidone which also has not been effective.  His mother had tremors late in life. He also has an uncle who had PD. He is retired form being a , he also was a . He denies chronic intake of dopamine blocking agents.  Given failure of meds as described above, he is coming here for opinion on the case regarding DBS candidacy.    Tremor ADL Scale 3/8/2023   1. Speaking 0   2. Feeding (spoon) 3   3. Drinking (glass) 3   4. Hygiene 3   5. Dressing 2   6. Pouring 3   7. Carry plate, tray 3   8. Use keys 2   9. Writing 3   10. Work or housework 0   11a. Overall disability 3   11b. Most affected task Carrying objects in his hand   12. Social impact 3   ADL TOTAL 28         ROS:  All others negative except as listed above. Pertinent movement disorders-specific ROS listed above.    Past Medical History:   Diagnosis Date     Acute insomnia      Benign essential tremor      BPH (benign prostatic hyperplasia)      Depression      Diabetes mellitus, type 2 (H)      Diabetic foot ulcers (H)      Diabetic ulcer of toe (H)      HTN (hypertension)      Hx of basal cell carcinoma      Hyperlipidemia      IBS (irritable bowel syndrome)      Neuropathy      Obesity      Obesity      Right knee pain      Snores      Tremor      Tubular adenoma of colon      Vitamin B12 deficiency         Past Surgical History:   Procedure Laterality Date     ARTHROPLASTY KNEE Left 11/27/2020    Procedure: LEFT TOTAL KNEE ARTHROPLASTY;  Surgeon: Mannie Lopes MD;  Location: [facility]     ARTHROSCOPY KNEE WITH MEDIAL MENISCECTOMY  10/24/2013    Procedure: ARTHROSCOPY KNEE WITH MEDIAL MENISCECTOMY;  RIGHT  KNEE ARTHROSCOPY, PARTIAL MEDIAL MENISCECTOMY, Patella Chrondoplasty  ;  Surgeon: Mannie Lopes MD;  Location: [facility]     ATHROSCOPIC KNEE SURGERY[       FISTULOTOMY RECTUM       JOINT REPLACEMENT       NASAL SEPTIM SURGERY[       REPAIR TENDON ACHILLES            Current Outpatient Medications:      acetaminophen (TYLENOL) 325 MG tablet, Take 2 tablets (650 mg) by mouth every 4 hours as needed for other (mild pain), Disp: 100 tablet, Rfl: 0     aspirin (ASA) 325 MG EC tablet, Take 1 tablet (325 mg) by mouth 2 times daily, Disp: 60 tablet, Rfl: 0     cetirizine (ZYRTEC) 10 MG tablet, Take 10 mg by mouth every evening, Disp: , Rfl:      cyanocobalamin (CYANOCOBALAMIN) 1000 MCG/ML injection, Inject 1 mL into the muscle every 30 days, Disp: , Rfl:      dicyclomine (BENTYL) 20 MG tablet, Take 20 mg by mouth every morning, Disp: , Rfl:      docusate sodium (COLACE) 100 MG tablet, Take 100 mg by mouth daily, Disp: , Rfl:      hydrOXYzine (ATARAX) 25 MG tablet, Take 1 tablet (25 mg) by mouth every 6 hours as needed for itching or anxiety (with pain, moderate pain), Disp: 30 tablet, Rfl: 0     Lisinopril (ZESTRIL PO), Take 10 mg by mouth daily , Disp: , Rfl:      metFORMIN (GLUCOPHAGE) 1000 MG tablet, Take 1,000 mg by mouth 2 times daily (with meals) , Disp: , Rfl:      Multiple Vitamins-Minerals (MULTIVITAMIN & MINERAL PO), Take 1 tablet by mouth daily , Disp: , Rfl:      oxyCODONE (ROXICODONE) 5 MG tablet, Take 1-2 tablets (5-10 mg) by mouth every 3 hours as needed for pain (Moderate to Severe), Disp: 50 tablet, Rfl: 0     pregabalin (LYRICA) 150 MG capsule, Take 150 mg by mouth 2 times daily, Disp: , Rfl:      primidone (MYSOLINE) 250 MG tablet, Take 250 mg by mouth 2 times daily (in addition to 50mg tablet = 300mg dose), Disp: , Rfl:      primidone (MYSOLINE) 50 MG tablet, Take 50 mg by mouth 2 times daily (in addition to 250mg tablet = 300mg dose), Disp: , Rfl:      PSYLLIUM PO, Take 1 Tablespoonful by mouth 2  times daily, Disp: , Rfl:      senna-docusate (SENOKOT-S/PERICOLACE) 8.6-50 MG tablet, Take 1-2 tablets by mouth 2 times daily Take while on oral narcotics to prevent or treat constipation., Disp: 30 tablet, Rfl: 0     tamsulosin (FLOMAX) 0.4 MG capsule, Take 0.8 mg by mouth every evening (2 x 0.4mg), Disp: , Rfl:      Vitamin D (Cholecalciferol) 25 MCG (1000 UT) CAPS, Take 1,000 Units by mouth every evening , Disp: , Rfl:      Allergies   Allergen Reactions     Hmg-Coa-R Inhibitors Other (See Comments)     Other Drug Allergy (See Comments) Muscle Pain (Myalgia)     Penicillins Other (See Comments)     Swelling at site of injection        No family history on file.     Social History:  He  reports that he quit smoking about 9 years ago. His smoking use included cigars. He has quit using smokeless tobacco.  His smokeless tobacco use included chew. He reports current alcohol use. He reports that he does not use drugs.     PHYSICAL EXAM:  /82   Pulse (!) 40   SpO2 95%      Gen: no acute distress, respirations appeared nonlabored    NEURO:  MS:  Alert, oriented, appropriate    CN:  PERRLA, EOMI, face symmetric, normal strength and sensation, tongue and palate are midline, SCM 5/5 throughout    Motor:    Normal tone, no fasciculations, strength is 5/5 throughout    Sensation:  Reduced in a stock/glove pattern to above wrists and above ankles to temperature and vibration    Coordination:  Normal finger-nose    Reflexes: trace throughout, with equivocal toes bilaterally    Gait:  Slightly wide based, good arm swing.    Tremor Motor Scale 3/8/2023   Assessment Time  5:40 PM   Medication On   DBS - Right Brain None   DBS - Left Brain None   Head 1.5   Face & Jaw 0   Voice 0.5   Outstretched - RIGHT 2   Outstretched - LEFT 2   Wingbeating - RIGHT 0   Wingbeating - LEFT 0   Kinetic - RIGHT 2.5   Kinetic - LEFT 2.5   Lower Limb - RIGHT 0   Lower Limb - LEFT 0   Lower Limb (Max) 0   Spiral - RIGHT 2   Spiral - LEFT 2    Handwriting 3   Dot approx - RIGHT 2.5   Dot approx - LEFT 2.5   Trunk (Standing) 0   Total Right 9   Total Left 9   Axial 2   TOTAL 23       DATA REVIEWED:  Reviewed most recent A1C from back in , was 7.4 (preferred to be below 8 by our neurosurgery team)    ASSESSMENT:  66 yo M with a longstanding tremor disorder affecting bilateral hands. Tremors are postural and very kinetic in nature, without parkinsonian or dystonic features, aspects of if confirmed on examination today. I do suspect he has a pure essential tremor disorder, that is at this point very disabling and refractory to medications.    PLAN:  - Reviewed impression and plan  - Discussed aspects of DBS surgery, provided education on the topic, reviewed risks, benefit, outpatient programming, timeline of events.   - Reviewed also different devices, expectations, reviewed what DBS works for and what it does not do. He has very reasonable and achievable goes which are    1- reduce tremors in hands and be able to perform iADLs more easily   2- potentially reduce and discontinue medications altogether (i.e. primidone and propranolol), since they seem to be providing side effects.  - Reviewed also the interdisciplinary process. We extended the visit today and performed the standardized scale and videotaping required for his preop assessment so we can forego a dedicated visit for that so we can expedite the process  - Placed neuropsych, MRI and neurosurgery consultations today, and notified our DBS coordinator to proceed with scheduling  - He is interested in moving forward with DBS, however he did share that he is relocating to [place] ([facility]) hopefully by the fall of next year, and he would likely try to transition his care to [facility] in [place], which he was planning on being seen as soon as he moved. We can help facilitating that process when the time comes.  - RTC with me pending surgical dates and dates set aside for  programming if he is deemed a candidate for neuromodulatory therapy. He was encouraged to call if questions, concerns, or changes in the meantime.     There are no Patient Instructions on file for this visit.      Ismael De Luna MD (Leo) (he/him/his)   of Neurology  [facility]  Department of Neurology      Thank you for referring Johny Pisano to our [facility] Disorders Program, we appreciate the opportunity of being your partner in healthcare. Please feel free to reach out to us at any point if any questions or concerns about this visit.    Attending attestation: Today I spent a total 110 minutes on this case, with greater than 50% of the time spent in face-to-face, examining, obtaining history, providing education and coordination of care. Additional time was spent in chart review, ancillary data, and documentation as delineated above.      Again, thank you for allowing me to participate in the care of your patient.        Sincerely,        Ismael Monterroso MD

## 2023-03-08 NOTE — NURSING NOTE
"Johny Pisano is a 67 year old male who presents for:  Chief Complaint   Patient presents with     DBS Evaluation     DBS consult for Essential tremor. Referred by Alis Redman NP. Pt's aware.        Initial Vitals:  /82   Pulse (!) 40   SpO2 95%  Estimated body mass index is 38.66 kg/m  as calculated from the following:    Height as of 11/27/20: 1.854 m (6' 1\").    Weight as of 11/27/20: 132.9 kg (293 lb).. There is no height or weight on file to calculate BSA. BP completed using cuff size: regular  No Pain (0)    Nursing Comments:     Marie Rojas MA  "

## 2023-03-08 NOTE — TELEPHONE ENCOUNTER
Mailed patient video consent form to address provided on form at the bottom: [address], [place], [place], [address]JJJP Consent Form     Marie Rojas MA

## 2023-03-09 ENCOUNTER — TELEPHONE (OUTPATIENT)
Dept: NEUROLOGY | Facility: CLINIC | Age: 68
End: 2023-03-09
Payer: COMMERCIAL

## 2023-03-09 NOTE — TELEPHONE ENCOUNTER
Scanned tremor test to [place] at  [email or web address].     Marie Rojas MA

## 2023-03-09 NOTE — TELEPHONE ENCOUNTER
Called the patient to get him scheduled for Deep Brain Stimulation work-up. The patient stated he did have a question about what his restrictions are after surgery, that he forgot to ask Dr. De Luna about. The patient was informed the writer could send him the medical considerations sheet and that may answer some of his questions once he signs up for MyChart. The patient stated he did have a MyChart account. The patient was informed he may have a MyChart through another health organization and that if would like to sign up for [facility], he will need to sign up for MyChart for the [employer] system as it is a separate system. The patient stated the writer is correct in that he does have another MyChart account at another health organization. The patient stated he will sign up for MyChart for [employer] and that would like to have the Deep Brain Stimulation Medical Considerations sent to him.     The patient was asked if he had received the Deep Brain Stimulation handbook at yesterday's appointment. The patient stated he did not. The patient was informed once the patient signs up for MyChart, the writer can send him the Deep Brain Stimulation videos and the handbook, onto his MyChart also. The patient verbally agreed to this plan.    The patient stated he would like to hold off on scheduling his work-up until after he has talked to his wife about her care, his after-care surgery (if he is approved), and their move to [place]. 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 The patient stated his wife is wheelchair bound and cannot walk or drive him to his appointments. The patient stated his wife has a surgery coming up on 3/23 to have new hardware installed in her ankle and he is hoping she will be able to walk again by May 2023. The patient stated he talked to Dr. De Luna about doing work-up here vs in [place] and he would like to stick to [place] because   [employer] informed him of the wait time in [place]. The patient also stated he does not want to wait until November 2023, to move to [place] as he would like to avoid the [place] winter. The patient was reassured that there is no rush to schedule and that [facility] can accommodate to his schedule, and that it would all depend on when the patient wanted to schedule. The patient was informed that if he had the work-up completed now, they would be good for 1 year, if for some reason he had to schedule surgery at a later time in the year. The patient verbalized his understanding of this.    The patient and the writer made a plan to touch base again next week after the patient has had time to speak to his wife about the plan going forward on their end as well as review the videos and handbook that the writer will send to the patient.    16:29 minute call

## 2023-04-15 ENCOUNTER — HEALTH MAINTENANCE LETTER (OUTPATIENT)
Age: 68
End: 2023-04-15

## 2023-09-16 ENCOUNTER — HEALTH MAINTENANCE LETTER (OUTPATIENT)
Age: 68
End: 2023-09-16

## 2024-02-03 ENCOUNTER — HEALTH MAINTENANCE LETTER (OUTPATIENT)
Age: 69
End: 2024-02-03

## 2024-06-22 ENCOUNTER — HEALTH MAINTENANCE LETTER (OUTPATIENT)
Age: 69
End: 2024-06-22

## 2024-07-15 ENCOUNTER — TELEPHONE (OUTPATIENT)
Dept: NEUROLOGY | Facility: CLINIC | Age: 69
End: 2024-07-15
Payer: COMMERCIAL

## 2024-07-15 DIAGNOSIS — G25.0 ESSENTIAL TREMOR: Primary | ICD-10-CM

## 2024-07-15 NOTE — TELEPHONE ENCOUNTER
[facility] Call Center    Phone Message    May a detailed message be left on voicemail: yes     Reason for Call: Other:       Patient requesting call back to discuss getting a referral or recommendation for providers at [facility] in [place] for moving forward. Patient wishes to proceed with the DBS surgery when he moves to [place], as discussed at his last visit with Dr Abner Monterroso.   Patient requesting call back to #[phone]      Action Taken: Message routed to:  Other: [facility]    Travel Screening: Not Applicable     Date of Service:

## 2024-07-16 NOTE — TELEPHONE ENCOUNTER
Called and spoke with patient regarding provider referral. Advised patient that referral requested has been placed for [facility]. Patient is very grateful and would like the provider to know he appreciates all of his help. Advised patient I will let provider know, and to contact our clinic back if any other questions or concerns. Patient understands.      Marie Rojas MA

## 2024-11-09 ENCOUNTER — HEALTH MAINTENANCE LETTER (OUTPATIENT)
Age: 69
End: 2024-11-09

## 2024-12-05 ENCOUNTER — TELEPHONE (OUTPATIENT)
Dept: NEUROLOGY | Facility: CLINIC | Age: 69
End: 2024-12-05
Payer: COMMERCIAL

## 2024-12-05 NOTE — TELEPHONE ENCOUNTER
Health Call Center    Phone Message    May a detailed message be left on voicemail: yes     Reason for Call:  Patient called states that he still has not heard back regarding his DBS referral, patient asking if  can refax it to [facility]? Patient called and referral was not received yet. Patient is asking for  call back to confirm that it has been faxed     Action Taken: Other: [facility]     Travel Screening: Not Applicable     Date of Service:

## 2024-12-05 NOTE — TELEPHONE ENCOUNTER
Faxed Form (Referral/DBS Evaluation)-December 5, 2024 to fax number [phone] [facility]     Right Fax confirmed at 2:30 PM    Marie Rojas MA

## 2024-12-05 NOTE — TELEPHONE ENCOUNTER
Called patient to confirm that we have resent referral to [facility]. Advised patient that if he does not hear anything from them by Monday or Tuesday next week to please contact our clinic back so we can reach out and connect with scheduling.    Patient understands and is happy that we re sent referral.     Marie Rojas MA

## 2025-03-02 ENCOUNTER — HEALTH MAINTENANCE LETTER (OUTPATIENT)
Age: 70
End: 2025-03-02

## 2025-06-05 ENCOUNTER — TRANSFERRED RECORDS (OUTPATIENT)
Dept: HEALTH INFORMATION MANAGEMENT | Facility: CLINIC | Age: 70
End: 2025-06-05
Payer: COMMERCIAL

## 2025-06-10 ENCOUNTER — TRANSFERRED RECORDS (OUTPATIENT)
Dept: HEALTH INFORMATION MANAGEMENT | Facility: CLINIC | Age: 70
End: 2025-06-10
Payer: COMMERCIAL

## 2025-06-21 ENCOUNTER — HEALTH MAINTENANCE LETTER (OUTPATIENT)
Age: 70
End: 2025-06-21

## 2025-07-12 ENCOUNTER — HEALTH MAINTENANCE LETTER (OUTPATIENT)
Age: 70
End: 2025-07-12

## (undated) DEVICE — SUCTION IRR SYSTEM W/O TIP INTERPULSE HANDPIECE 0210-100-000

## (undated) DEVICE — WRAP EZY KNEE

## (undated) DEVICE — DRAPE STERI U 1015

## (undated) DEVICE — SU ETHIBOND 0 CTX CR  8X18" CX31D

## (undated) DEVICE — PACK TOTAL KNEE SOP15TKFSD

## (undated) DEVICE — GLOVE PROTEXIS POWDER FREE 7.5 ORTHOPEDIC 2D73ET75

## (undated) DEVICE — GLOVE PROTEXIS POWDER FREE 7.0 ORTHOPEDIC 2D73ET70

## (undated) DEVICE — SU VICRYL 2-0 CP-1 27" UND J266H

## (undated) DEVICE — SOL NACL 0.9% IRRIG 3000ML BAG 2B7477

## (undated) DEVICE — SU VICRYL 0 CP-1 27" J467H

## (undated) DEVICE — SU VICRYL 4-0 RB-1 27" J304

## (undated) DEVICE — BLADE SAW SAGITTAL STRK 25X90X1.37MM 4H SYS 6 6125-137-090

## (undated) DEVICE — DRAPE SHEET REV FOLD 3/4 9349

## (undated) DEVICE — DRSG XEROFORM 5X9" 8884431605

## (undated) DEVICE — SOLUTION WOUND CLEANSING 3/4OZ 10% PVP EA-L3011FB-50

## (undated) DEVICE — CAST PADDING 6" STERILE 9046S

## (undated) DEVICE — ESU GROUND PAD UNIVERSAL W/O CORD

## (undated) DEVICE — BONE CLEANING TIP INTERPULSE  0210-010-000

## (undated) DEVICE — GLOVE PROTEXIS BLUE W/NEU-THERA 7.0  2D73EB70

## (undated) DEVICE — SYR 50ML LL W/O NDL 309653

## (undated) DEVICE — GLOVE PROTEXIS BLUE W/NEU-THERA 8.0  2D73EB80

## (undated) DEVICE — BLADE SAW SAGITTAL STRK 18X90X1.27MM HD SYS 6 6118-127-090

## (undated) DEVICE — SOL NACL 0.9% IRRIG 1000ML BOTTLE 2F7124

## (undated) DEVICE — NDL SPINAL 18GA 3.5" 405184

## (undated) DEVICE — LINEN TOWEL PACK X5 5464

## (undated) DEVICE — PREP CHLORAPREP 26ML TINTED ORANGE  260815

## (undated) DEVICE — DRAIN ROUND W/RESERV KIT JACKSON PRATT 10FR 400ML SU130-402D

## (undated) DEVICE — BONE CEMENT MIXEVAC III HI VAC KIT  0206-015-000

## (undated) DEVICE — HOOD FLYTE W/PEELAWAY 408-800-100

## (undated) DEVICE — MANIFOLD NEPTUNE 4 PORT 700-20

## (undated) DEVICE — CAST PADDING 6" UNSTERILE 9046

## (undated) DEVICE — STPL SKIN 35W 6.9MM  PXW35

## (undated) DEVICE — SUCTION TIP YANKAUER W/O VENT K86

## (undated) DEVICE — SOL WATER IRRIG 1000ML BOTTLE 2F7114

## (undated) RX ORDER — CEFAZOLIN SODIUM IN 0.9 % NACL 3 G/100 ML
INTRAVENOUS SOLUTION, PIGGYBACK (ML) INTRAVENOUS
Status: DISPENSED
Start: 2020-11-27

## (undated) RX ORDER — PROPOFOL 10 MG/ML
INJECTION, EMULSION INTRAVENOUS
Status: DISPENSED
Start: 2020-11-27

## (undated) RX ORDER — CEFAZOLIN SODIUM 1 G/3ML
INJECTION, POWDER, FOR SOLUTION INTRAMUSCULAR; INTRAVENOUS
Status: DISPENSED
Start: 2020-11-27

## (undated) RX ORDER — LIDOCAINE HYDROCHLORIDE 20 MG/ML
INJECTION, SOLUTION EPIDURAL; INFILTRATION; INTRACAUDAL; PERINEURAL
Status: DISPENSED
Start: 2020-11-27

## (undated) RX ORDER — FENTANYL CITRATE 50 UG/ML
INJECTION, SOLUTION INTRAMUSCULAR; INTRAVENOUS
Status: DISPENSED
Start: 2020-11-27

## (undated) RX ORDER — ACETAMINOPHEN 325 MG/1
TABLET ORAL
Status: DISPENSED
Start: 2020-11-27

## (undated) RX ORDER — TRANEXAMIC ACID 650 MG/1
TABLET ORAL
Status: DISPENSED
Start: 2020-11-27

## (undated) RX ORDER — ONDANSETRON 2 MG/ML
INJECTION INTRAMUSCULAR; INTRAVENOUS
Status: DISPENSED
Start: 2020-11-27